# Patient Record
Sex: MALE | Race: WHITE | NOT HISPANIC OR LATINO | URBAN - METROPOLITAN AREA
[De-identification: names, ages, dates, MRNs, and addresses within clinical notes are randomized per-mention and may not be internally consistent; named-entity substitution may affect disease eponyms.]

---

## 2017-01-06 ENCOUNTER — EMERGENCY (EMERGENCY)
Facility: HOSPITAL | Age: 37
LOS: 1 days | Discharge: HOME | End: 2017-01-06
Attending: EMERGENCY MEDICINE
Payer: SELF-PAY

## 2017-01-06 VITALS
RESPIRATION RATE: 20 BRPM | TEMPERATURE: 99 F | DIASTOLIC BLOOD PRESSURE: 77 MMHG | SYSTOLIC BLOOD PRESSURE: 108 MMHG | HEART RATE: 85 BPM | WEIGHT: 154.98 LBS | OXYGEN SATURATION: 100 % | HEIGHT: 68 IN

## 2017-01-06 DIAGNOSIS — M25.561 PAIN IN RIGHT KNEE: ICD-10-CM

## 2017-01-06 DIAGNOSIS — M54.5 LOW BACK PAIN: ICD-10-CM

## 2017-01-06 DIAGNOSIS — G89.29 OTHER CHRONIC PAIN: ICD-10-CM

## 2017-01-06 DIAGNOSIS — Z98.890 OTHER SPECIFIED POSTPROCEDURAL STATES: ICD-10-CM

## 2017-01-06 DIAGNOSIS — M54.9 DORSALGIA, UNSPECIFIED: ICD-10-CM

## 2017-01-06 DIAGNOSIS — Z98.890 OTHER SPECIFIED POSTPROCEDURAL STATES: Chronic | ICD-10-CM

## 2017-01-06 DIAGNOSIS — Z79.01 LONG TERM (CURRENT) USE OF ANTICOAGULANTS: ICD-10-CM

## 2017-01-06 PROBLEM — Z00.00 ENCOUNTER FOR PREVENTIVE HEALTH EXAMINATION: Status: ACTIVE | Noted: 2017-01-06

## 2017-01-06 PROCEDURE — 99282 EMERGENCY DEPT VISIT SF MDM: CPT

## 2017-01-06 PROCEDURE — 99284 EMERGENCY DEPT VISIT MOD MDM: CPT | Mod: 25

## 2017-01-06 RX ORDER — ACETAMINOPHEN 500 MG
650 TABLET ORAL ONCE
Qty: 0 | Refills: 0 | Status: COMPLETED | OUTPATIENT
Start: 2017-01-06 | End: 2017-01-06

## 2017-01-06 RX ADMIN — Medication 650 MILLIGRAM(S): at 12:43

## 2017-01-06 RX ADMIN — Medication 650 MILLIGRAM(S): at 11:51

## 2017-01-06 NOTE — ED STATDOCS - ATTENDING CONTRIBUTION TO CARE
I, Masoud Sotelo, performed the initial face to face bedside interview with this patient regarding history of present illness, review of symptoms and relevant past medical, social and family history.  I completed an independent physical examination.  I was the provider who initially evaluated this patient.  The history, relevant review of systems, past medical and surgical history, medical decision making, and physical examination was documented by the scribe in my presence and I attest to the accuracy of the documentation. Follow-up on ordered tests (ie labs, radiologic studies) and re-evaluation of the patient's status has been communicated to the ACP.  Disposition of the patient will be based on test outcome and response to ED interventions.
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
## 2017-01-06 NOTE — ED STATDOCS - CARE PLAN
Principal Discharge DX:	Chronic pain of right knee  Secondary Diagnosis:	Chronic midline low back pain without sciatica
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
## 2017-01-06 NOTE — ED STATDOCS - MEDICAL DECISION MAKING DETAILS
35 y/o M pt transferred from [facility] for social work intervention and emergency housing placement. Aram rosado pt.

## 2017-01-06 NOTE — ED STATDOCS - NS ED MD SCRIBE ATTENDING SCRIBE SECTIONS
DISPOSITION/PROGRESS NOTE REVIEW OF SYSTEMS/PHYSICAL EXAM/PAST MEDICAL/SURGICAL/SOCIAL HISTORY/DISPOSITION/HISTORY OF PRESENT ILLNESS/VITAL SIGNS( Pullset)/HIV

## 2017-01-07 VITALS
RESPIRATION RATE: 17 BRPM | HEART RATE: 67 BPM | SYSTOLIC BLOOD PRESSURE: 106 MMHG | TEMPERATURE: 98 F | DIASTOLIC BLOOD PRESSURE: 71 MMHG | OXYGEN SATURATION: 100 %

## 2017-02-16 ENCOUNTER — APPOINTMENT (OUTPATIENT)
Dept: ORTHOPEDIC SURGERY | Facility: CLINIC | Age: 37
End: 2017-02-16

## 2017-02-16 VITALS
TEMPERATURE: 99.7 F | WEIGHT: 150 LBS | BODY MASS INDEX: 22.22 KG/M2 | HEART RATE: 91 BPM | HEIGHT: 69 IN | SYSTOLIC BLOOD PRESSURE: 119 MMHG | DIASTOLIC BLOOD PRESSURE: 84 MMHG

## 2017-02-16 DIAGNOSIS — S72.391D OTHER FRACTURE OF SHAFT OF RIGHT FEMUR, SUBSEQUENT ENCOUNTER FOR CLOSED FRACTURE WITH ROUTINE HEALING: ICD-10-CM

## 2017-02-16 DIAGNOSIS — S62.615D DISPLACED FRACTURE OF PROXIMAL PHALANX OF LEFT RING FINGER, SUBSEQUENT ENCOUNTER FOR FRACTURE WITH ROUTINE HEALING: ICD-10-CM

## 2017-02-16 DIAGNOSIS — F17.200 NICOTINE DEPENDENCE, UNSPECIFIED, UNCOMPLICATED: ICD-10-CM

## 2017-02-16 DIAGNOSIS — Z78.9 OTHER SPECIFIED HEALTH STATUS: ICD-10-CM

## 2017-02-16 DIAGNOSIS — M79.642 PAIN IN RIGHT HAND: ICD-10-CM

## 2017-02-16 DIAGNOSIS — M79.641 PAIN IN RIGHT HAND: ICD-10-CM

## 2017-02-16 DIAGNOSIS — S62.325D DISPLACED FRACTURE OF SHAFT OF FOURTH METACARPAL BONE, LEFT HAND, SUBSEQUENT ENCOUNTER FOR FRACTURE WITH ROUTINE HEALING: ICD-10-CM

## 2017-02-16 DIAGNOSIS — S62.356D NONDISPLACED FRACTURE OF SHAFT OF FIFTH METACARPAL BONE, RIGHT HAND, SUBSEQUENT ENCOUNTER FOR FRACTURE WITH ROUTINE HEALING: ICD-10-CM

## 2017-02-16 PROBLEM — S06.9X9A UNSPECIFIED INTRACRANIAL INJURY WITH LOSS OF CONSCIOUSNESS OF UNSPECIFIED DURATION, INITIAL ENCOUNTER: Chronic | Status: ACTIVE | Noted: 2017-01-06

## 2017-02-16 PROBLEM — F19.90 OTHER PSYCHOACTIVE SUBSTANCE USE, UNSPECIFIED, UNCOMPLICATED: Chronic | Status: ACTIVE | Noted: 2017-01-06

## 2017-06-20 ENCOUNTER — APPOINTMENT (OUTPATIENT)
Dept: ORTHOPEDIC SURGERY | Facility: CLINIC | Age: 37
End: 2017-06-20

## 2018-06-18 ENCOUNTER — EMERGENCY (EMERGENCY)
Facility: HOSPITAL | Age: 38
LOS: 1 days | Discharge: HOME | End: 2018-06-18
Attending: EMERGENCY MEDICINE
Payer: MEDICAID

## 2018-06-18 VITALS
HEART RATE: 95 BPM | OXYGEN SATURATION: 89 % | WEIGHT: 169.98 LBS | HEIGHT: 67 IN | SYSTOLIC BLOOD PRESSURE: 92 MMHG | DIASTOLIC BLOOD PRESSURE: 59 MMHG | TEMPERATURE: 98 F | RESPIRATION RATE: 12 BRPM

## 2018-06-18 DIAGNOSIS — Z98.890 OTHER SPECIFIED POSTPROCEDURAL STATES: Chronic | ICD-10-CM

## 2018-06-18 LAB
ALBUMIN SERPL ELPH-MCNC: 4.1 G/DL — SIGNIFICANT CHANGE UP (ref 3.3–5.2)
ALP SERPL-CCNC: 77 U/L — SIGNIFICANT CHANGE UP (ref 40–120)
ALT FLD-CCNC: 14 U/L — SIGNIFICANT CHANGE UP
ANION GAP SERPL CALC-SCNC: 16 MMOL/L — SIGNIFICANT CHANGE UP (ref 5–17)
APAP SERPL-MCNC: <7.5 UG/ML — LOW (ref 10–26)
AST SERPL-CCNC: 18 U/L — SIGNIFICANT CHANGE UP
BASOPHILS NFR BLD AUTO: 2 % — SIGNIFICANT CHANGE UP (ref 0–2)
BILIRUB SERPL-MCNC: 0.6 MG/DL — SIGNIFICANT CHANGE UP (ref 0.4–2)
BUN SERPL-MCNC: 10 MG/DL — SIGNIFICANT CHANGE UP (ref 8–20)
CALCIUM SERPL-MCNC: 9.2 MG/DL — SIGNIFICANT CHANGE UP (ref 8.6–10.2)
CHLORIDE SERPL-SCNC: 102 MMOL/L — SIGNIFICANT CHANGE UP (ref 98–107)
CO2 SERPL-SCNC: 24 MMOL/L — SIGNIFICANT CHANGE UP (ref 22–29)
CREAT SERPL-MCNC: 0.8 MG/DL — SIGNIFICANT CHANGE UP (ref 0.5–1.3)
EOSINOPHIL NFR BLD AUTO: 1 % — SIGNIFICANT CHANGE UP (ref 0–6)
ETHANOL SERPL-MCNC: 46 MG/DL — SIGNIFICANT CHANGE UP
GLUCOSE SERPL-MCNC: 89 MG/DL — SIGNIFICANT CHANGE UP (ref 70–115)
HCT VFR BLD CALC: 42.4 % — SIGNIFICANT CHANGE UP (ref 42–52)
HGB BLD-MCNC: 14.5 G/DL — SIGNIFICANT CHANGE UP (ref 14–18)
LYMPHOCYTES # BLD AUTO: 32 % — SIGNIFICANT CHANGE UP (ref 20–55)
MCHC RBC-ENTMCNC: 33.3 PG — HIGH (ref 27–31)
MCHC RBC-ENTMCNC: 34.2 G/DL — SIGNIFICANT CHANGE UP (ref 32–36)
MCV RBC AUTO: 97.2 FL — HIGH (ref 80–94)
MONOCYTES NFR BLD AUTO: 3 % — SIGNIFICANT CHANGE UP (ref 3–10)
NEUTROPHILS NFR BLD AUTO: 62 % — SIGNIFICANT CHANGE UP (ref 37–73)
PCP SPEC-MCNC: SIGNIFICANT CHANGE UP
PLATELET # BLD AUTO: 352 K/UL — SIGNIFICANT CHANGE UP (ref 150–400)
POTASSIUM SERPL-MCNC: 3.4 MMOL/L — LOW (ref 3.5–5.3)
POTASSIUM SERPL-SCNC: 3.4 MMOL/L — LOW (ref 3.5–5.3)
PROT SERPL-MCNC: 6.8 G/DL — SIGNIFICANT CHANGE UP (ref 6.6–8.7)
RBC # BLD: 4.36 M/UL — LOW (ref 4.6–6.2)
RBC # FLD: 13 % — SIGNIFICANT CHANGE UP (ref 11–15.6)
SALICYLATES SERPL-MCNC: <0.6 MG/DL — LOW (ref 10–20)
SODIUM SERPL-SCNC: 142 MMOL/L — SIGNIFICANT CHANGE UP (ref 135–145)
WBC # BLD: 8.2 K/UL — SIGNIFICANT CHANGE UP (ref 4.8–10.8)
WBC # FLD AUTO: 8.2 K/UL — SIGNIFICANT CHANGE UP (ref 4.8–10.8)

## 2018-06-18 PROCEDURE — 36415 COLL VENOUS BLD VENIPUNCTURE: CPT

## 2018-06-18 PROCEDURE — 80053 COMPREHEN METABOLIC PANEL: CPT

## 2018-06-18 PROCEDURE — 93005 ELECTROCARDIOGRAM TRACING: CPT

## 2018-06-18 PROCEDURE — 99285 EMERGENCY DEPT VISIT HI MDM: CPT

## 2018-06-18 PROCEDURE — 99284 EMERGENCY DEPT VISIT MOD MDM: CPT

## 2018-06-18 PROCEDURE — 93010 ELECTROCARDIOGRAM REPORT: CPT

## 2018-06-18 PROCEDURE — 85027 COMPLETE CBC AUTOMATED: CPT

## 2018-06-18 PROCEDURE — 80307 DRUG TEST PRSMV CHEM ANLYZR: CPT

## 2018-06-18 RX ORDER — SODIUM CHLORIDE 9 MG/ML
1000 INJECTION INTRAMUSCULAR; INTRAVENOUS; SUBCUTANEOUS ONCE
Qty: 0 | Refills: 0 | Status: COMPLETED | OUTPATIENT
Start: 2018-06-18 | End: 2018-06-18

## 2018-06-18 RX ADMIN — SODIUM CHLORIDE 1000 MILLILITER(S): 9 INJECTION INTRAMUSCULAR; INTRAVENOUS; SUBCUTANEOUS at 21:10

## 2018-06-18 NOTE — ED PROVIDER NOTE - PROGRESS NOTE DETAILS
clincially sober mabuilating in nad nml vitals no si no hi return to ed for intractable HA, persistent vomiting, or new onset motor/sensory deficits

## 2018-06-18 NOTE — ED PROVIDER NOTE - CONSTITUTIONAL, MLM
normal... Well appearing, well nourished, awake, alert, oriented to person, place and in no apparent distress, lethargic, awakes to verbal stimuli

## 2018-06-18 NOTE — ED PROVIDER NOTE - PHYSICAL EXAMINATION
perrl  eomi  cranial nerves  axox2  extremitoes 2+ rad pulses  no evidence of injuries  wel healed mdline scar  no dti

## 2018-06-18 NOTE — ED PROVIDER NOTE - OBJECTIVE STATEMENT
36 y/o M pt presents to the ED BIBA with c/o intoxication. Pt was found AMS at [facility] after taking approximately 22 xanax. Pt denies Etoh or any other drugs. A&Ox2. Pt responds to painful stimuli. Denies abd pain, NVD, fevers, chills, fall, dizziness.  No further complaints at this time. 38 y/o M pt presents to the ED BIBA with c/o intoxication. Pt was found AMS at [facility] after taking approximately 22tabs of 0.5mg xanax (confirmed by pill count at bedside - prescription filled today). Pt denies Etoh or any other drugs. A&Ox2. Pt responds to painful stimuli. Denies abd pain, NVD, fevers, chills, fall, dizziness.  No further complaints at this time.

## 2018-06-18 NOTE — ED ADULT NURSE NOTE - OBJECTIVE STATEMENT
Pt received in [id number] on cm with , in yellow gown with no belongings @ bedside per policy, #20G IV noted to right ac, site asymp IVF infusing, pt BIBA c/o overdose on xanax, pt reports he took xanax and drank 2x beers and was trying to order pizza when he fell asleep. Pt airway patent, RR even and unlabored, NSR on cm, Ranjana 2 100% on 2l/min via NC and End tidal co2 in place. MAEx4, denies trauma denies daily ETOH use denies falls denies LOC denies pain, safety maintained, HOB elevated, in no apparent distress @ this time

## 2018-06-18 NOTE — ED ADULT TRIAGE NOTE - CHIEF COMPLAINT QUOTE
Pt biba s/p being found in a store, pt was taking multiple Xanax pills while he was ordering pizza at the register.  Pt is responsive to painful stimuli.  Pt has alcohol on breath.  Pt denies any thought hurting himself.  Pt placed on yellow gown.

## 2018-06-18 NOTE — ED PROVIDER NOTE - ATTENDING CONTRIBUTION TO CARE
36 y/o M pt presents to the ED BIBA with c/o intoxication. Pt was found AMS at [facility] after taking approximately 22tabs of 0.5mg xanax (confirmed by pill count at bedside - prescription filled today). Pt denies Etoh or any other drugs. A&Ox2. Pt responds to painful stimuli. Alert, lucid, and in no apparent distress. Pt is normocephalic, atraumatic.  Pupils are equal, round,External ear without bleeding or mass, no nasal discharge or malodor, lips pink, moist mucous membranes, tongue midline. Neck supple.   Lungs clear to ausultation. Heart regular rate and rhythm, normal S1, S2, no murmurs, gallops, rubs.  Abdomen is soft, nontender, no pulsatile mass, no masses, no distension, no rebound. No CVA Tenderness, no suprapubic tenderness.   Non-focal sensory, 5 out of 5 motor strength,   bzd overdose await sobriety

## 2018-06-18 NOTE — ED ADULT NURSE NOTE - CHPI ED SYMPTOMS NEG
no abdominal distension/no abdominal pain/no confusion/no vomiting/no chills/no pain/no nausea/no fever

## 2018-06-18 NOTE — ED PROVIDER NOTE - MEDICAL DECISION MAKING DETAILS
38 y/o M no sig pmhx presents with xanax OD. Will check labs, monitor rate, no need for flumazenil due to nml respiratory status, consult social work, DC when stable.

## 2018-06-19 VITALS
OXYGEN SATURATION: 100 % | DIASTOLIC BLOOD PRESSURE: 78 MMHG | SYSTOLIC BLOOD PRESSURE: 112 MMHG | RESPIRATION RATE: 18 BRPM | HEART RATE: 81 BPM

## 2018-06-19 NOTE — ED ADULT NURSE REASSESSMENT NOTE - NS ED NURSE REASSESS COMMENT FT1
MD made aware pt requesting to take home xanax prescription, MD Green bedside to discuss with pt, Charge RN made aware, Rx xanax given to charge rn for lock up with security, pt to get approval from MD who Rx'd xanax to take home, pt pending p/u from cab
xanax placed with security, Pt made aware # [id number]
Pt awake and alert, ambulating in ED with steady gait noted, appears clinically sober and in no apparent distress, pt states "im sober when can I leave here?", MD Green made aware, pending d/c from MD Green, pt back on stretcher in [id number], calm and cooperative resting @ this time, safety maintained

## 2018-08-20 ENCOUNTER — EMERGENCY (EMERGENCY)
Facility: HOSPITAL | Age: 38
LOS: 1 days | Discharge: HOME | End: 2018-08-20
Attending: EMERGENCY MEDICINE
Payer: MEDICAID

## 2018-08-20 VITALS
SYSTOLIC BLOOD PRESSURE: 116 MMHG | OXYGEN SATURATION: 94 % | TEMPERATURE: 98 F | HEIGHT: 67 IN | HEART RATE: 94 BPM | WEIGHT: 160.06 LBS | DIASTOLIC BLOOD PRESSURE: 72 MMHG | RESPIRATION RATE: 18 BRPM

## 2018-08-20 DIAGNOSIS — Z79.899 OTHER LONG TERM (CURRENT) DRUG THERAPY: ICD-10-CM

## 2018-08-20 DIAGNOSIS — Z98.890 OTHER SPECIFIED POSTPROCEDURAL STATES: Chronic | ICD-10-CM

## 2018-08-20 DIAGNOSIS — Z98.890 OTHER SPECIFIED POSTPROCEDURAL STATES: ICD-10-CM

## 2018-08-20 DIAGNOSIS — F10.129 ALCOHOL ABUSE WITH INTOXICATION, UNSPECIFIED: ICD-10-CM

## 2018-08-20 DIAGNOSIS — F17.200 NICOTINE DEPENDENCE, UNSPECIFIED, UNCOMPLICATED: ICD-10-CM

## 2018-08-20 DIAGNOSIS — Z79.01 LONG TERM (CURRENT) USE OF ANTICOAGULANTS: ICD-10-CM

## 2018-08-20 DIAGNOSIS — Z79.891 LONG TERM (CURRENT) USE OF OPIATE ANALGESIC: ICD-10-CM

## 2018-08-20 PROCEDURE — 82962 GLUCOSE BLOOD TEST: CPT

## 2018-08-20 PROCEDURE — 99284 EMERGENCY DEPT VISIT MOD MDM: CPT

## 2018-08-20 PROCEDURE — 99285 EMERGENCY DEPT VISIT HI MDM: CPT

## 2018-08-20 NOTE — ED PROVIDER NOTE - PHYSICAL EXAMINATION
VITAL SIGNS: I have reviewed nursing notes and confirm.  CONSTITUTIONAL: Well-developed; well-nourished; in no acute distress.  SKIN: Skin exam is warm and dry, no acute rash.  HEAD: Normocephalic; atraumatic.   EYES: PERRL, EOM intact; (+) bilatera eye injection.   ENT: No nasal discharge; airway clear. Throat clear.  NECK: Supple; non tender.  No lymphadenopathy.  CARD: S1, S2 normal; no murmurs, gallops, or rubs. Regular rate and rhythm.  RESP: No wheezes, rales or rhonchi.  ABD: Normal bowel sounds; soft; non-distended; non-tender; no hepatosplenomegaly.  EXT: Normal ROM. No clubbing, cyanosis or edema.  NEURO: slurred speech, AO x 3, moves all extremities, Grossly unremarkable. No focal deficits.  PSYCH: Cooperative, appropriate.
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
## 2018-08-20 NOTE — ED ADULT NURSE NOTE - NSIMPLEMENTINTERV_GEN_ALL_ED
Implemented All Fall Risk Interventions:  [place] to call system. Call bell, personal items and telephone within reach. Instruct patient to call for assistance. Room bathroom lighting operational. Non-slip footwear when patient is off stretcher. Physically safe environment: no spills, clutter or unnecessary equipment. Stretcher in lowest position, wheels locked, appropriate side rails in place. Provide visual cue, wrist band, yellow gown, etc. Monitor gait and stability. Monitor for mental status changes and reorient to person, place, and time. Review medications for side effects contributing to fall risk. Reinforce activity limits and safety measures with patient and family.

## 2018-08-20 NOTE — ED PROVIDER NOTE - NS ED ROS FT
Review of Systems:  	•	CONSTITUTIONAL - no fever, no diaphoresis, no weight change  	•	SKIN - no rash  	•	HEMATOLOGIC - no bleeding, no bruising  	•	EYES - no eye pain, no blurred vision  	•	ENT - no change in hearing, no pain  	•	RESPIRATORY - no shortness of breath, no cough  	•	CARDIAC - no chest pain, no palpitations  	•	GI - no abd pain, no nausea, no vomiting, no diarrhea, no constipation, no bleeding  	•	GENITO-URINARY - no discharge, no dysuria; no hematuria,   	•	ENDO - no polydypsia, no polyurea, no heat/no cold intolerance  	•	MUSCULOSKELETAL - no joint paint, no swelling, no redness  	•	NEUROLOGIC - no weakness, no headache, no anesthesia, no paresthesias  	•	PSYCH - no anxiety, non suicidal, non homicidal, no hallucination, no depression

## 2018-10-16 ENCOUNTER — EMERGENCY (EMERGENCY)
Facility: HOSPITAL | Age: 38
LOS: 1 days | Discharge: HOME | End: 2018-10-16
Attending: STUDENT IN AN ORGANIZED HEALTH CARE EDUCATION/TRAINING PROGRAM
Payer: MEDICAID

## 2018-10-16 VITALS
WEIGHT: 156.97 LBS | RESPIRATION RATE: 18 BRPM | HEIGHT: 68 IN | OXYGEN SATURATION: 99 % | HEART RATE: 61 BPM | TEMPERATURE: 98 F | DIASTOLIC BLOOD PRESSURE: 92 MMHG | SYSTOLIC BLOOD PRESSURE: 146 MMHG

## 2018-10-16 DIAGNOSIS — Z98.890 OTHER SPECIFIED POSTPROCEDURAL STATES: Chronic | ICD-10-CM

## 2018-10-16 PROCEDURE — 99284 EMERGENCY DEPT VISIT MOD MDM: CPT | Mod: 25

## 2018-10-16 NOTE — ED ADULT TRIAGE NOTE - CHIEF COMPLAINT QUOTE
pt biba c/o increased right-sided weakness.  pt states he usually has weakness before he has seizures, but has not had a seizure in two years.  no witnessed seizure activity.  pt's speech is slow.  as per ems, staff at group home stated speech is baseline due to hx TBI.  no facial droop noted.  pt CORBIN.  hand grasp and foot press equal and strong b/l.

## 2018-10-17 VITALS
RESPIRATION RATE: 18 BRPM | OXYGEN SATURATION: 99 % | HEART RATE: 68 BPM | TEMPERATURE: 98 F | DIASTOLIC BLOOD PRESSURE: 76 MMHG | SYSTOLIC BLOOD PRESSURE: 129 MMHG

## 2018-10-17 LAB
ALBUMIN SERPL ELPH-MCNC: 4.1 G/DL — SIGNIFICANT CHANGE UP (ref 3.3–5.2)
ALP SERPL-CCNC: 74 U/L — SIGNIFICANT CHANGE UP (ref 40–120)
ALT FLD-CCNC: 10 U/L — SIGNIFICANT CHANGE UP
ANION GAP SERPL CALC-SCNC: 9 MMOL/L — SIGNIFICANT CHANGE UP (ref 5–17)
ANISOCYTOSIS BLD QL: SLIGHT — SIGNIFICANT CHANGE UP
AST SERPL-CCNC: 17 U/L — SIGNIFICANT CHANGE UP
BASOPHILS NFR BLD AUTO: 2 % — SIGNIFICANT CHANGE UP (ref 0–2)
BILIRUB SERPL-MCNC: 0.4 MG/DL — SIGNIFICANT CHANGE UP (ref 0.4–2)
BUN SERPL-MCNC: 21 MG/DL — HIGH (ref 8–20)
CALCIUM SERPL-MCNC: 9.1 MG/DL — SIGNIFICANT CHANGE UP (ref 8.6–10.2)
CHLORIDE SERPL-SCNC: 108 MMOL/L — HIGH (ref 98–107)
CO2 SERPL-SCNC: 27 MMOL/L — SIGNIFICANT CHANGE UP (ref 22–29)
CREAT SERPL-MCNC: 0.72 MG/DL — SIGNIFICANT CHANGE UP (ref 0.5–1.3)
EOSINOPHIL NFR BLD AUTO: 4 % — SIGNIFICANT CHANGE UP (ref 0–6)
GLUCOSE SERPL-MCNC: 89 MG/DL — SIGNIFICANT CHANGE UP (ref 70–115)
HCT VFR BLD CALC: 43.2 % — SIGNIFICANT CHANGE UP (ref 42–52)
HGB BLD-MCNC: 14.4 G/DL — SIGNIFICANT CHANGE UP (ref 14–18)
LYMPHOCYTES # BLD AUTO: 14 % — LOW (ref 20–55)
MACROCYTES BLD QL: SLIGHT — SIGNIFICANT CHANGE UP
MCHC RBC-ENTMCNC: 33.3 G/DL — SIGNIFICANT CHANGE UP (ref 32–36)
MCHC RBC-ENTMCNC: 34.1 PG — HIGH (ref 27–31)
MCV RBC AUTO: 102.4 FL — HIGH (ref 80–94)
MICROCYTES BLD QL: SLIGHT — SIGNIFICANT CHANGE UP
MONOCYTES NFR BLD AUTO: 5 % — SIGNIFICANT CHANGE UP (ref 3–10)
NEUTROPHILS NFR BLD AUTO: 75 % — HIGH (ref 37–73)
PLAT MORPH BLD: NORMAL — SIGNIFICANT CHANGE UP
PLATELET # BLD AUTO: 386 K/UL — SIGNIFICANT CHANGE UP (ref 150–400)
POTASSIUM SERPL-MCNC: 3.8 MMOL/L — SIGNIFICANT CHANGE UP (ref 3.5–5.3)
POTASSIUM SERPL-SCNC: 3.8 MMOL/L — SIGNIFICANT CHANGE UP (ref 3.5–5.3)
PROT SERPL-MCNC: 6.6 G/DL — SIGNIFICANT CHANGE UP (ref 6.6–8.7)
RBC # BLD: 4.22 M/UL — LOW (ref 4.6–6.2)
RBC # FLD: 13.8 % — SIGNIFICANT CHANGE UP (ref 11–15.6)
RBC BLD AUTO: ABNORMAL
SODIUM SERPL-SCNC: 144 MMOL/L — SIGNIFICANT CHANGE UP (ref 135–145)
TSH SERPL-MCNC: 3.57 UIU/ML — SIGNIFICANT CHANGE UP (ref 0.27–4.2)
WBC # BLD: 12.1 K/UL — HIGH (ref 4.8–10.8)
WBC # FLD AUTO: 12.1 K/UL — HIGH (ref 4.8–10.8)

## 2018-10-17 PROCEDURE — 80053 COMPREHEN METABOLIC PANEL: CPT

## 2018-10-17 PROCEDURE — 99283 EMERGENCY DEPT VISIT LOW MDM: CPT

## 2018-10-17 PROCEDURE — 85027 COMPLETE CBC AUTOMATED: CPT

## 2018-10-17 PROCEDURE — 84443 ASSAY THYROID STIM HORMONE: CPT

## 2018-10-17 PROCEDURE — 36415 COLL VENOUS BLD VENIPUNCTURE: CPT

## 2018-10-17 NOTE — ED ADULT NURSE NOTE - NSIMPLEMENTINTERV_GEN_ALL_ED
Implemented All Universal Safety Interventions:  [place] to call system. Call bell, personal items and telephone within reach. Instruct patient to call for assistance. Room bathroom lighting operational. Non-slip footwear when patient is off stretcher. Physically safe environment: no spills, clutter or unnecessary equipment. Stretcher in lowest position, wheels locked, appropriate side rails in place.

## 2018-10-17 NOTE — ED ADULT NURSE NOTE - OBJECTIVE STATEMENT
Pt. presents to ED with c/o weakness. Slurred speech noted, states Hx of TBI. able to ambulate independently

## 2018-10-17 NOTE — ED PROVIDER NOTE - MEDICAL DECISION MAKING DETAILS
obtain labs, evaluate for acute reason of weakness, likely related to pt's fatigue from poor sleeping and use of xanax.

## 2018-10-17 NOTE — ED PROVIDER NOTE - OBJECTIVE STATEMENT
39 y/o M pt with PMHx TBI, spleen surgery, anxiety presents to the ED c/o persistent dizziness and weakness for 3 weeks.  Pt states he doesn't sleep well.  He takes xanax.  Denies fever, chills, N/V, back pain, urinary symptoms, LOC.  No further acute complaints at this time.

## 2018-11-01 ENCOUNTER — OUTPATIENT (OUTPATIENT)
Dept: OUTPATIENT SERVICES | Facility: HOSPITAL | Age: 38
LOS: 1 days | End: 2018-11-01
Payer: MEDICAID

## 2018-11-01 DIAGNOSIS — Z98.890 OTHER SPECIFIED POSTPROCEDURAL STATES: Chronic | ICD-10-CM

## 2018-11-16 ENCOUNTER — EMERGENCY (EMERGENCY)
Facility: HOSPITAL | Age: 38
LOS: 1 days | Discharge: HOME | End: 2018-11-16
Attending: EMERGENCY MEDICINE
Payer: MEDICAID

## 2018-11-16 VITALS
WEIGHT: 160.06 LBS | RESPIRATION RATE: 20 BRPM | DIASTOLIC BLOOD PRESSURE: 80 MMHG | HEIGHT: 68 IN | OXYGEN SATURATION: 98 % | SYSTOLIC BLOOD PRESSURE: 118 MMHG | HEART RATE: 85 BPM | TEMPERATURE: 98 F

## 2018-11-16 VITALS
RESPIRATION RATE: 17 BRPM | TEMPERATURE: 97 F | OXYGEN SATURATION: 98 % | SYSTOLIC BLOOD PRESSURE: 112 MMHG | HEART RATE: 92 BPM | DIASTOLIC BLOOD PRESSURE: 72 MMHG

## 2018-11-16 DIAGNOSIS — Z98.890 OTHER SPECIFIED POSTPROCEDURAL STATES: Chronic | ICD-10-CM

## 2018-11-16 PROBLEM — S06.9X9A UNSPECIFIED INTRACRANIAL INJURY WITH LOSS OF CONSCIOUSNESS OF UNSPECIFIED DURATION, INITIAL ENCOUNTER: Chronic | Status: ACTIVE | Noted: 2018-10-17

## 2018-11-16 LAB
ALBUMIN SERPL ELPH-MCNC: 4.6 G/DL — SIGNIFICANT CHANGE UP (ref 3.3–5.2)
ALP SERPL-CCNC: 101 U/L — SIGNIFICANT CHANGE UP (ref 40–120)
ALT FLD-CCNC: 18 U/L — SIGNIFICANT CHANGE UP
AMPHET UR-MCNC: NEGATIVE — SIGNIFICANT CHANGE UP
ANION GAP SERPL CALC-SCNC: 13 MMOL/L — SIGNIFICANT CHANGE UP (ref 5–17)
APAP SERPL-MCNC: <7.5 UG/ML — LOW (ref 10–26)
APTT BLD: 30.7 SEC — SIGNIFICANT CHANGE UP (ref 27.5–36.3)
AST SERPL-CCNC: 24 U/L — SIGNIFICANT CHANGE UP
BARBITURATES UR SCN-MCNC: NEGATIVE — SIGNIFICANT CHANGE UP
BENZODIAZ UR-MCNC: POSITIVE
BILIRUB SERPL-MCNC: 0.5 MG/DL — SIGNIFICANT CHANGE UP (ref 0.4–2)
BUN SERPL-MCNC: 11 MG/DL — SIGNIFICANT CHANGE UP (ref 8–20)
CALCIUM SERPL-MCNC: 9.4 MG/DL — SIGNIFICANT CHANGE UP (ref 8.6–10.2)
CHLORIDE SERPL-SCNC: 100 MMOL/L — SIGNIFICANT CHANGE UP (ref 98–107)
CO2 SERPL-SCNC: 27 MMOL/L — SIGNIFICANT CHANGE UP (ref 22–29)
COCAINE METAB.OTHER UR-MCNC: NEGATIVE — SIGNIFICANT CHANGE UP
CREAT SERPL-MCNC: 0.63 MG/DL — SIGNIFICANT CHANGE UP (ref 0.5–1.3)
ETHANOL SERPL-MCNC: 19 MG/DL — SIGNIFICANT CHANGE UP
GLUCOSE SERPL-MCNC: 124 MG/DL — HIGH (ref 70–115)
HCT VFR BLD CALC: 45.8 % — SIGNIFICANT CHANGE UP (ref 42–52)
HGB BLD-MCNC: 15.4 G/DL — SIGNIFICANT CHANGE UP (ref 14–18)
INR BLD: 0.96 RATIO — SIGNIFICANT CHANGE UP (ref 0.88–1.16)
MCHC RBC-ENTMCNC: 33.6 G/DL — SIGNIFICANT CHANGE UP (ref 32–36)
MCHC RBC-ENTMCNC: 34.1 PG — HIGH (ref 27–31)
MCV RBC AUTO: 101.6 FL — HIGH (ref 80–94)
METHADONE UR-MCNC: NEGATIVE — SIGNIFICANT CHANGE UP
OPIATES UR-MCNC: NEGATIVE — SIGNIFICANT CHANGE UP
PCP SPEC-MCNC: SIGNIFICANT CHANGE UP
PCP UR-MCNC: NEGATIVE — SIGNIFICANT CHANGE UP
PLATELET # BLD AUTO: 399 K/UL — SIGNIFICANT CHANGE UP (ref 150–400)
POTASSIUM SERPL-MCNC: 4.1 MMOL/L — SIGNIFICANT CHANGE UP (ref 3.5–5.3)
POTASSIUM SERPL-SCNC: 4.1 MMOL/L — SIGNIFICANT CHANGE UP (ref 3.5–5.3)
PROT SERPL-MCNC: 7.4 G/DL — SIGNIFICANT CHANGE UP (ref 6.6–8.7)
PROTHROM AB SERPL-ACNC: 11 SEC — SIGNIFICANT CHANGE UP (ref 10–12.9)
RBC # BLD: 4.51 M/UL — LOW (ref 4.6–6.2)
RBC # FLD: 13.2 % — SIGNIFICANT CHANGE UP (ref 11–15.6)
SALICYLATES SERPL-MCNC: <0.6 MG/DL — LOW (ref 10–20)
SODIUM SERPL-SCNC: 140 MMOL/L — SIGNIFICANT CHANGE UP (ref 135–145)
THC UR QL: NEGATIVE — SIGNIFICANT CHANGE UP
WBC # BLD: 8.2 K/UL — SIGNIFICANT CHANGE UP (ref 4.8–10.8)
WBC # FLD AUTO: 8.2 K/UL — SIGNIFICANT CHANGE UP (ref 4.8–10.8)

## 2018-11-16 PROCEDURE — 85730 THROMBOPLASTIN TIME PARTIAL: CPT

## 2018-11-16 PROCEDURE — 99284 EMERGENCY DEPT VISIT MOD MDM: CPT | Mod: 25

## 2018-11-16 PROCEDURE — 70450 CT HEAD/BRAIN W/O DYE: CPT | Mod: 26

## 2018-11-16 PROCEDURE — 36415 COLL VENOUS BLD VENIPUNCTURE: CPT

## 2018-11-16 PROCEDURE — 93010 ELECTROCARDIOGRAM REPORT: CPT

## 2018-11-16 PROCEDURE — 80053 COMPREHEN METABOLIC PANEL: CPT

## 2018-11-16 PROCEDURE — 93005 ELECTROCARDIOGRAM TRACING: CPT

## 2018-11-16 PROCEDURE — 99284 EMERGENCY DEPT VISIT MOD MDM: CPT

## 2018-11-16 PROCEDURE — 85027 COMPLETE CBC AUTOMATED: CPT

## 2018-11-16 PROCEDURE — 85610 PROTHROMBIN TIME: CPT

## 2018-11-16 PROCEDURE — 80307 DRUG TEST PRSMV CHEM ANLYZR: CPT

## 2018-11-16 PROCEDURE — 70450 CT HEAD/BRAIN W/O DYE: CPT

## 2018-11-16 RX ORDER — SODIUM CHLORIDE 9 MG/ML
1000 INJECTION INTRAMUSCULAR; INTRAVENOUS; SUBCUTANEOUS
Qty: 0 | Refills: 0 | Status: DISCONTINUED | OUTPATIENT
Start: 2018-11-16 | End: 2018-11-21

## 2018-11-16 RX ORDER — SODIUM CHLORIDE 9 MG/ML
3 INJECTION INTRAMUSCULAR; INTRAVENOUS; SUBCUTANEOUS ONCE
Qty: 0 | Refills: 0 | Status: COMPLETED | OUTPATIENT
Start: 2018-11-16 | End: 2018-11-16

## 2018-11-16 RX ADMIN — SODIUM CHLORIDE 100 MILLILITER(S): 9 INJECTION INTRAMUSCULAR; INTRAVENOUS; SUBCUTANEOUS at 18:23

## 2018-11-16 RX ADMIN — SODIUM CHLORIDE 3 MILLILITER(S): 9 INJECTION INTRAMUSCULAR; INTRAVENOUS; SUBCUTANEOUS at 18:23

## 2018-11-16 NOTE — ED ADULT NURSE NOTE - NSIMPLEMENTINTERV_GEN_ALL_ED
Implemented All Fall Risk Interventions:  [place] to call system. Call bell, personal items and telephone within reach. Instruct patient to call for assistance. Room bathroom lighting operational. Non-slip footwear when patient is off stretcher. Physically safe environment: no spills, clutter or unnecessary equipment. Stretcher in lowest position, wheels locked, appropriate side rails in place. Provide visual cue, wrist band, yellow gown, etc. Monitor gait and stability. Monitor for mental status changes and reorient to person, place, and time. Review medications for side effects contributing to fall risk. Reinforce activity limits and safety measures with patient and family.

## 2018-11-16 NOTE — ED PROVIDER NOTE - SHIFT CHANGE DETAILS
PT SENT FROM [facility] FOR MEDICAL CLEARANCE  PT IS MEDICALLY CLEAR AND CAN BE DISCHARGE BUT I DON'T KNOW WHERE HE CAME FROM  [facility] TO DISPO/PLACE
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
## 2018-11-16 NOTE — ED PROVIDER NOTE - OBJECTIVE STATEMENT
C/C " I FELL"  39 YO MALE WITH ABOVE C/C. SENT HERE FROM [facility] STAFF FELT HE WAS ACTING ABNORMALLY. PT STATES HE IS ON AMITRIIPTYLINE AND ALPRAZOLAM FOR SEIZURES. PT HAS HX TBI. PT STATES HE FEELS WELL AND TAKES HIS MEDICATION. PT SHOWED ME HIS MEDICATION AND I DISCOVERED THAT HE TOOK MORE THAN PRESCRIBED AMITRIPTYLINE AND PRESCRIBED DOSE OF ALPRAZOLAM. HE HAS NO COMPLAINTS OF PAIN.   MED HX IV drug user    TBI (traumatic brain injury)    TBI (traumatic brain injury)
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
## 2018-11-16 NOTE — ED ADULT NURSE NOTE - OBJECTIVE STATEMENT
assumed pt care at 1635. pt sleepy but arousable to name. stated he last drank a beer at 2pm and took xanax earlier today as well. stated he had a seizure and fell but this was not his first seizure. pt denies any chest pain or SOB. No s/s of respiratory distress noted. Pt denies nausea/vomiting/diarrhea. No diaphoresis noted. Pt has alcohol on his breath. Placed in yellow gown for safety. will continue to monitor.

## 2018-11-19 DIAGNOSIS — Z71.89 OTHER SPECIFIED COUNSELING: ICD-10-CM

## 2018-12-03 ENCOUNTER — EMERGENCY (EMERGENCY)
Facility: HOSPITAL | Age: 38
LOS: 1 days | Discharge: HOME | End: 2018-12-03
Attending: EMERGENCY MEDICINE
Payer: MEDICAID

## 2018-12-03 VITALS
OXYGEN SATURATION: 97 % | HEIGHT: 67 IN | TEMPERATURE: 98 F | SYSTOLIC BLOOD PRESSURE: 103 MMHG | RESPIRATION RATE: 18 BRPM | WEIGHT: 164.91 LBS | HEART RATE: 81 BPM | DIASTOLIC BLOOD PRESSURE: 71 MMHG

## 2018-12-03 DIAGNOSIS — Z98.890 OTHER SPECIFIED POSTPROCEDURAL STATES: Chronic | ICD-10-CM

## 2018-12-03 LAB
ALBUMIN SERPL ELPH-MCNC: 4.4 G/DL — SIGNIFICANT CHANGE UP (ref 3.3–5.2)
ALP SERPL-CCNC: 94 U/L — SIGNIFICANT CHANGE UP (ref 40–120)
ALT FLD-CCNC: 11 U/L — SIGNIFICANT CHANGE UP
AMPHET UR-MCNC: NEGATIVE — SIGNIFICANT CHANGE UP
ANION GAP SERPL CALC-SCNC: 14 MMOL/L — SIGNIFICANT CHANGE UP (ref 5–17)
APAP SERPL-MCNC: <7.5 UG/ML — LOW (ref 10–26)
APPEARANCE UR: CLEAR — SIGNIFICANT CHANGE UP
APTT BLD: 31.8 SEC — SIGNIFICANT CHANGE UP (ref 27.5–36.3)
AST SERPL-CCNC: 16 U/L — SIGNIFICANT CHANGE UP
BARBITURATES UR SCN-MCNC: NEGATIVE — SIGNIFICANT CHANGE UP
BASOPHILS NFR BLD AUTO: 2 % — SIGNIFICANT CHANGE UP (ref 0–2)
BENZODIAZ UR-MCNC: NEGATIVE — SIGNIFICANT CHANGE UP
BILIRUB SERPL-MCNC: 0.4 MG/DL — SIGNIFICANT CHANGE UP (ref 0.4–2)
BILIRUB UR-MCNC: NEGATIVE — SIGNIFICANT CHANGE UP
BUN SERPL-MCNC: 14 MG/DL — SIGNIFICANT CHANGE UP (ref 8–20)
BURR CELLS BLD QL SMEAR: PRESENT — SIGNIFICANT CHANGE UP
CALCIUM SERPL-MCNC: 9.1 MG/DL — SIGNIFICANT CHANGE UP (ref 8.6–10.2)
CHLORIDE SERPL-SCNC: 105 MMOL/L — SIGNIFICANT CHANGE UP (ref 98–107)
CO2 SERPL-SCNC: 26 MMOL/L — SIGNIFICANT CHANGE UP (ref 22–29)
COCAINE METAB.OTHER UR-MCNC: NEGATIVE — SIGNIFICANT CHANGE UP
COLOR SPEC: YELLOW — SIGNIFICANT CHANGE UP
CREAT SERPL-MCNC: 0.73 MG/DL — SIGNIFICANT CHANGE UP (ref 0.5–1.3)
DIFF PNL FLD: NEGATIVE — SIGNIFICANT CHANGE UP
ELLIPTOCYTES BLD QL SMEAR: SLIGHT — SIGNIFICANT CHANGE UP
EOSINOPHIL NFR BLD AUTO: 1 % — SIGNIFICANT CHANGE UP (ref 0–6)
ETHANOL SERPL-MCNC: 176 MG/DL — SIGNIFICANT CHANGE UP
GIANT PLATELETS BLD QL SMEAR: PRESENT — SIGNIFICANT CHANGE UP
GLUCOSE SERPL-MCNC: 95 MG/DL — SIGNIFICANT CHANGE UP (ref 70–115)
GLUCOSE UR QL: NEGATIVE MG/DL — SIGNIFICANT CHANGE UP
HCT VFR BLD CALC: 47.3 % — SIGNIFICANT CHANGE UP (ref 42–52)
HGB BLD-MCNC: 16.2 G/DL — SIGNIFICANT CHANGE UP (ref 14–18)
INR BLD: 1.05 RATIO — SIGNIFICANT CHANGE UP (ref 0.88–1.16)
KETONES UR-MCNC: NEGATIVE — SIGNIFICANT CHANGE UP
LEUKOCYTE ESTERASE UR-ACNC: NEGATIVE — SIGNIFICANT CHANGE UP
LYMPHOCYTES # BLD AUTO: 37 % — SIGNIFICANT CHANGE UP (ref 20–55)
MCHC RBC-ENTMCNC: 34.1 PG — HIGH (ref 27–31)
MCHC RBC-ENTMCNC: 34.2 G/DL — SIGNIFICANT CHANGE UP (ref 32–36)
MCV RBC AUTO: 99.6 FL — HIGH (ref 80–94)
METHADONE UR-MCNC: NEGATIVE — SIGNIFICANT CHANGE UP
MONOCYTES NFR BLD AUTO: 6 % — SIGNIFICANT CHANGE UP (ref 3–10)
NEUTROPHILS NFR BLD AUTO: 54 % — SIGNIFICANT CHANGE UP (ref 37–73)
NITRITE UR-MCNC: NEGATIVE — SIGNIFICANT CHANGE UP
OPIATES UR-MCNC: NEGATIVE — SIGNIFICANT CHANGE UP
OSMOLALITY SERPL: 351 MOSM/KG — HIGH (ref 280–300)
PCP SPEC-MCNC: SIGNIFICANT CHANGE UP
PCP UR-MCNC: NEGATIVE — SIGNIFICANT CHANGE UP
PH UR: 6.5 — SIGNIFICANT CHANGE UP (ref 5–8)
PLAT MORPH BLD: NORMAL — SIGNIFICANT CHANGE UP
PLATELET # BLD AUTO: 555 K/UL — HIGH (ref 150–400)
POTASSIUM SERPL-MCNC: 4 MMOL/L — SIGNIFICANT CHANGE UP (ref 3.5–5.3)
POTASSIUM SERPL-SCNC: 4 MMOL/L — SIGNIFICANT CHANGE UP (ref 3.5–5.3)
PROT SERPL-MCNC: 7.4 G/DL — SIGNIFICANT CHANGE UP (ref 6.6–8.7)
PROT UR-MCNC: NEGATIVE MG/DL — SIGNIFICANT CHANGE UP
PROTHROM AB SERPL-ACNC: 12.1 SEC — SIGNIFICANT CHANGE UP (ref 10–12.9)
RBC # BLD: 4.75 M/UL — SIGNIFICANT CHANGE UP (ref 4.6–6.2)
RBC # FLD: 12.8 % — SIGNIFICANT CHANGE UP (ref 11–15.6)
RBC BLD AUTO: SIGNIFICANT CHANGE UP
SALICYLATES SERPL-MCNC: <0.6 MG/DL — LOW (ref 10–20)
SODIUM SERPL-SCNC: 145 MMOL/L — SIGNIFICANT CHANGE UP (ref 135–145)
SP GR SPEC: 1.01 — SIGNIFICANT CHANGE UP (ref 1.01–1.02)
THC UR QL: NEGATIVE — SIGNIFICANT CHANGE UP
UROBILINOGEN FLD QL: NEGATIVE MG/DL — SIGNIFICANT CHANGE UP
WBC # BLD: 4.8 K/UL — SIGNIFICANT CHANGE UP (ref 4.8–10.8)
WBC # FLD AUTO: 4.8 K/UL — SIGNIFICANT CHANGE UP (ref 4.8–10.8)

## 2018-12-03 PROCEDURE — 70450 CT HEAD/BRAIN W/O DYE: CPT | Mod: 26

## 2018-12-03 PROCEDURE — 99284 EMERGENCY DEPT VISIT MOD MDM: CPT

## 2018-12-03 PROCEDURE — 83930 ASSAY OF BLOOD OSMOLALITY: CPT

## 2018-12-03 PROCEDURE — 85027 COMPLETE CBC AUTOMATED: CPT

## 2018-12-03 PROCEDURE — 85730 THROMBOPLASTIN TIME PARTIAL: CPT

## 2018-12-03 PROCEDURE — 80053 COMPREHEN METABOLIC PANEL: CPT

## 2018-12-03 PROCEDURE — 70450 CT HEAD/BRAIN W/O DYE: CPT

## 2018-12-03 PROCEDURE — 85610 PROTHROMBIN TIME: CPT

## 2018-12-03 PROCEDURE — 36415 COLL VENOUS BLD VENIPUNCTURE: CPT

## 2018-12-03 PROCEDURE — 80307 DRUG TEST PRSMV CHEM ANLYZR: CPT

## 2018-12-03 PROCEDURE — 81003 URINALYSIS AUTO W/O SCOPE: CPT

## 2018-12-03 RX ORDER — LEVETIRACETAM 250 MG/1
1 TABLET, FILM COATED ORAL
Qty: 60 | Refills: 0 | OUTPATIENT
Start: 2018-12-03 | End: 2019-01-01

## 2018-12-03 RX ORDER — LEVETIRACETAM 250 MG/1
500 TABLET, FILM COATED ORAL ONCE
Qty: 0 | Refills: 0 | Status: COMPLETED | OUTPATIENT
Start: 2018-12-03 | End: 2018-12-03

## 2018-12-03 RX ORDER — SODIUM CHLORIDE 9 MG/ML
1000 INJECTION INTRAMUSCULAR; INTRAVENOUS; SUBCUTANEOUS ONCE
Qty: 0 | Refills: 0 | Status: COMPLETED | OUTPATIENT
Start: 2018-12-03 | End: 2018-12-03

## 2018-12-03 RX ADMIN — SODIUM CHLORIDE 1000 MILLILITER(S): 9 INJECTION INTRAMUSCULAR; INTRAVENOUS; SUBCUTANEOUS at 15:57

## 2018-12-03 RX ADMIN — LEVETIRACETAM 500 MILLIGRAM(S): 250 TABLET, FILM COATED ORAL at 20:14

## 2018-12-03 NOTE — ED ADULT NURSE NOTE - OBJECTIVE STATEMENT
Pt states that he had a seizure while walking home earlier today. denies hitting his head. No complaints of pain.

## 2018-12-03 NOTE — ED PROVIDER NOTE - MUSCULOSKELETAL, MLM
Spine appears normal, range of motion is not limited, no muscle or joint tenderness. No tremor. Spine appears normal, range of motion is not limited, no muscle or joint tenderness. No tremor. No scalp hematoma.

## 2018-12-03 NOTE — ED ADULT TRIAGE NOTE - CHIEF COMPLAINT QUOTE
pt arrive by EMS from [facility] with reports by EMS of taking Ambien and drinking alcohol. pt denies these claims, states "I had a seizure an hour and half ago"

## 2018-12-03 NOTE — ED PROVIDER NOTE - SKIN, MLM
Skin normal color for race, warm, dry and intact. No evidence of rash. Skin normal color for race, warm, dry and intact. No evidence of rash. No external signs of trauma.

## 2018-12-03 NOTE — ED PROVIDER NOTE - OBJECTIVE STATEMENT
38yoM with h/o seizures ( gets one every 6 months), TBI, not followed by neurology , c/o " possible seizure" today. Pt denies any tongue biting/ urinary incontinece.  Stes he had a seizure in a store and that the store keepers told him he had one.  Pt denies any seizure medication.  PMD: Dr. Gracia.  Pt states he was here a few weeks ago also for seizure.  Pt states he has not followed up with neurology because he does not know of a seizure near where he lives. He states his psychologist is in [place].  Pt states he had 2 beers today.  PT states the psychiatrist prescribes duloxetine and xanax. Pt deneis any recent illness/ fever/chills. Reports normal PO intake. Pt currently living in [place]. Pt's last drink was at noon . Pt denies any symptoms of withdrawal. 38yoM with h/o seizures ( gets one every 6 months), TBI, not followed by neurology , c/o " possible seizure" today. Pt denies any tongue biting/ urinary incontinece.  States he had a seizure in a store and that the store keepers told him he had one.  Pt denies any seizure medication.  PMD: Dr. Gracia.  Pt states he was here a few weeks ago also for seizure.  Pt states he has not followed up with neurology because he does not know of a seizure near where he lives. He states his psychologist is in [place].  Pt states he had 2 beers today.  PT states the psychiatrist prescribes duloxetine and xanax. Pt deneis any recent illness/ fever/chills. Reports normal PO intake. Pt currently living in [place]. Pt's last drink was at noon . Pt denies any symptoms of withdrawal. 38yoM with h/o seizures ( gets one every 6 months), TBI, not followed by neurology , c/o " possible seizure" today. Pt denies any tongue biting/ urinary incontinence.  States he had a seizure in a store and that the store keepers told him he had one.  Pt denies any seizure medication.  PMD: Dr. Gracia.  Pt states he was here a few weeks ago also for seizure.  Pt states he has not followed up with neurology because he does not know of a seizure near where he lives. He states his psychologist is in [place].  Pt states he had 2 beers today.  PT states the psychiatrist prescribes duloxetine and xanax. Pt deneis any recent illness/ fever/chills. Reports normal PO intake. Pt currently living in [place]. Pt's last drink was at noon . Pt denies any symptoms of withdrawal.

## 2018-12-03 NOTE — ED PROVIDER NOTE - ENMT, MLM
Airway patent, Nasal mucosa clear. Mouth with normal mucosa. Throat has no vesicles, no oropharyngeal exudates and uvula is midline. No tongue fasciculations

## 2018-12-03 NOTE — ED ADULT NURSE NOTE - NSIMPLEMENTINTERV_GEN_ALL_ED
Implemented All Fall Risk Interventions:  [place] to call system. Call bell, personal items and telephone within reach. Instruct patient to call for assistance. Room bathroom lighting operational. Non-slip footwear when patient is off stretcher. Physically safe environment: no spills, clutter or unnecessary equipment. Stretcher in lowest position, wheels locked, appropriate side rails in place. Provide visual cue, wrist band, yellow gown, etc. Monitor gait and stability. Monitor for mental status changes and reorient to person, place, and time. Review medications for side effects contributing to fall risk. Reinforce activity limits and safety measures with patient and family.

## 2018-12-03 NOTE — ED PROVIDER NOTE - PROGRESS NOTE DETAILS
pt asking to go home;  I discussed seizures with Dr. Hawk and he recommends that we start patient on 500mg BID keppra with outpt f/u with him. Pt understands risk of seizure especially with alcohol; does not drive. Ensures he will take the medication and f/u.

## 2018-12-03 NOTE — ED PROVIDER NOTE - MEDICAL DECISION MAKING DETAILS
38yoM with h/o TBI, previous seizures, not on AEM, pw presumed seizure today. No external signs of trauma; pt not clinically intoxicated or complaining of or displaying s/o alcohol withdrawal. Plan to check labs, CTh, d/w neurology for possible initiation of AEMs.

## 2018-12-03 NOTE — ED ADULT NURSE NOTE - CHIEF COMPLAINT QUOTE
pt arrive by EMS from [facility] with reports by EMS of taking Ambien and drinking alcohol. pt denies these claims, states "I had a seizure an hour and half ago"

## 2019-01-22 ENCOUNTER — EMERGENCY (EMERGENCY)
Facility: HOSPITAL | Age: 39
LOS: 1 days | Discharge: HOME | End: 2019-01-22
Attending: STUDENT IN AN ORGANIZED HEALTH CARE EDUCATION/TRAINING PROGRAM
Payer: MEDICAID

## 2019-01-22 VITALS
SYSTOLIC BLOOD PRESSURE: 107 MMHG | DIASTOLIC BLOOD PRESSURE: 76 MMHG | TEMPERATURE: 97 F | HEART RATE: 104 BPM | RESPIRATION RATE: 20 BRPM | OXYGEN SATURATION: 98 %

## 2019-01-22 VITALS
RESPIRATION RATE: 20 BRPM | OXYGEN SATURATION: 96 % | HEIGHT: 67 IN | DIASTOLIC BLOOD PRESSURE: 80 MMHG | TEMPERATURE: 98 F | HEART RATE: 84 BPM | SYSTOLIC BLOOD PRESSURE: 113 MMHG | WEIGHT: 184.97 LBS

## 2019-01-22 DIAGNOSIS — Z98.890 OTHER SPECIFIED POSTPROCEDURAL STATES: Chronic | ICD-10-CM

## 2019-01-22 PROCEDURE — 99284 EMERGENCY DEPT VISIT MOD MDM: CPT

## 2019-01-22 PROCEDURE — 99285 EMERGENCY DEPT VISIT HI MDM: CPT

## 2019-01-22 RX ADMIN — Medication 50 MILLIGRAM(S): at 20:23

## 2019-01-22 RX ADMIN — Medication 50 MILLIGRAM(S): at 17:19

## 2019-01-22 NOTE — ED PROVIDER NOTE - MEDICAL DECISION MAKING DETAILS
Patient with acute alcohol use without indication of withdrawal. Patient observed and has remained stable during ED observation.

## 2019-01-22 NOTE — ED PROVIDER NOTE - CHPI ED SYMPTOMS NEG
no abdominal distension/no fever/no disorientation/no chills/no nausea/no weakness/no abdominal pain/no confusion/no vomiting/no pain

## 2019-01-22 NOTE — ED PROVIDER NOTE - OBJECTIVE STATEMENT
The patient is a 38 year old male presents with alcohol intoxication and denies any complaints. No HA, No CP, No SOB, No abd pain
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
## 2019-01-22 NOTE — ED ADULT NURSE NOTE - NSIMPLEMENTINTERV_GEN_ALL_ED
Implemented All Fall Risk Interventions:  [place] to call system. Call bell, personal items and telephone within reach. Instruct patient to call for assistance. Room bathroom lighting operational. Non-slip footwear when patient is off stretcher. Physically safe environment: no spills, clutter or unnecessary equipment. Stretcher in lowest position, wheels locked, appropriate side rails in place. Provide visual cue, wrist band, yellow gown, etc. Monitor gait and stability. Monitor for mental status changes and reorient to person, place, and time. Review medications for side effects contributing to fall risk. Reinforce activity limits and safety measures with patient and family.

## 2019-02-21 ENCOUNTER — EMERGENCY (EMERGENCY)
Facility: HOSPITAL | Age: 39
LOS: 1 days | Discharge: HOME | End: 2019-02-21
Attending: EMERGENCY MEDICINE
Payer: MEDICAID

## 2019-02-21 VITALS
HEART RATE: 88 BPM | DIASTOLIC BLOOD PRESSURE: 78 MMHG | OXYGEN SATURATION: 97 % | TEMPERATURE: 98 F | RESPIRATION RATE: 18 BRPM | SYSTOLIC BLOOD PRESSURE: 104 MMHG

## 2019-02-21 DIAGNOSIS — Z98.890 OTHER SPECIFIED POSTPROCEDURAL STATES: Chronic | ICD-10-CM

## 2019-02-21 PROCEDURE — 99284 EMERGENCY DEPT VISIT MOD MDM: CPT

## 2019-02-21 PROCEDURE — 99285 EMERGENCY DEPT VISIT HI MDM: CPT

## 2019-02-21 NOTE — ED ADULT NURSE REASSESSMENT NOTE - NS ED NURSE REASSESS COMMENT FT1
Pt returned from CT scan. Pt remains sleeping in stretcher in view of nurses station. In no apparent distress, arousable to voice. Will continue to closely monitor.

## 2019-02-21 NOTE — ED PROVIDER NOTE - PROGRESS NOTE DETAILS
Case s/o to Dr Briceño for sobriety Pt observed in ED.  Awake and alert at this time.  Ambulatory with steady gait and stable for d/c

## 2019-02-21 NOTE — ED ADULT NURSE REASSESSMENT NOTE - NS ED NURSE REASSESS COMMENT FT1
Pt remains sleeping in stretcher in view of nurses station. In no apparent distress, arousable to touch. Able to state name and birth date. Will continue to closely monitor.

## 2019-02-21 NOTE — ED ADULT TRIAGE NOTE - CHIEF COMPLAINT QUOTE
Patient BIBA, found outside deli intoxicated, patient admits to drinking "2 beers", patient slurring words, appears unkept.

## 2019-02-21 NOTE — ED PROVIDER NOTE - CHPI ED SYMPTOMS NEG
no abdominal pain/no disorientation/no fever/no vomiting/no nausea/no pain/no abdominal distension/no confusion/no chills/no weakness

## 2019-02-21 NOTE — ED ADULT NURSE REASSESSMENT NOTE - NS ED NURSE REASSESS COMMENT FT1
this RN found patient with prescription xanax 0.5. Pt with one pill in mouth, pt spit it out. Two on gown. MD Sandra made aware. Xanax to be locked up with security.

## 2019-02-22 VITALS
TEMPERATURE: 98 F | DIASTOLIC BLOOD PRESSURE: 76 MMHG | HEART RATE: 67 BPM | OXYGEN SATURATION: 98 % | RESPIRATION RATE: 18 BRPM | SYSTOLIC BLOOD PRESSURE: 110 MMHG

## 2019-06-16 ENCOUNTER — EMERGENCY (EMERGENCY)
Facility: HOSPITAL | Age: 39
LOS: 1 days | Discharge: HOME | End: 2019-06-16
Attending: EMERGENCY MEDICINE
Payer: MEDICAID

## 2019-06-16 VITALS
HEIGHT: 68 IN | TEMPERATURE: 98 F | OXYGEN SATURATION: 98 % | RESPIRATION RATE: 18 BRPM | SYSTOLIC BLOOD PRESSURE: 111 MMHG | HEART RATE: 100 BPM | DIASTOLIC BLOOD PRESSURE: 74 MMHG | WEIGHT: 160.06 LBS

## 2019-06-16 DIAGNOSIS — Z98.890 OTHER SPECIFIED POSTPROCEDURAL STATES: Chronic | ICD-10-CM

## 2019-06-16 LAB
ALBUMIN SERPL ELPH-MCNC: 4.2 G/DL — SIGNIFICANT CHANGE UP (ref 3.3–5.2)
ALP SERPL-CCNC: 85 U/L — SIGNIFICANT CHANGE UP (ref 40–120)
ALT FLD-CCNC: 15 U/L — SIGNIFICANT CHANGE UP
AMPHET UR-MCNC: NEGATIVE — SIGNIFICANT CHANGE UP
ANION GAP SERPL CALC-SCNC: 16 MMOL/L — SIGNIFICANT CHANGE UP (ref 5–17)
AST SERPL-CCNC: 22 U/L — SIGNIFICANT CHANGE UP
BARBITURATES UR SCN-MCNC: NEGATIVE — SIGNIFICANT CHANGE UP
BASOPHILS NFR BLD AUTO: 3 % — HIGH (ref 0–2)
BENZODIAZ UR-MCNC: POSITIVE
BILIRUB SERPL-MCNC: 0.9 MG/DL — SIGNIFICANT CHANGE UP (ref 0.4–2)
BUN SERPL-MCNC: 30 MG/DL — HIGH (ref 8–20)
CALCIUM SERPL-MCNC: 8.9 MG/DL — SIGNIFICANT CHANGE UP (ref 8.6–10.2)
CHLORIDE SERPL-SCNC: 103 MMOL/L — SIGNIFICANT CHANGE UP (ref 98–107)
CO2 SERPL-SCNC: 21 MMOL/L — LOW (ref 22–29)
COCAINE METAB.OTHER UR-MCNC: NEGATIVE — SIGNIFICANT CHANGE UP
CREAT SERPL-MCNC: 0.76 MG/DL — SIGNIFICANT CHANGE UP (ref 0.5–1.3)
EOSINOPHIL NFR BLD AUTO: 2 % — SIGNIFICANT CHANGE UP (ref 0–6)
ETHANOL SERPL-MCNC: <10 MG/DL — SIGNIFICANT CHANGE UP
GLUCOSE SERPL-MCNC: 73 MG/DL — SIGNIFICANT CHANGE UP (ref 70–115)
HCT VFR BLD CALC: 41.3 % — LOW (ref 42–52)
HGB BLD-MCNC: 14.1 G/DL — SIGNIFICANT CHANGE UP (ref 14–18)
LYMPHOCYTES # BLD AUTO: 14 % — LOW (ref 20–55)
MCHC RBC-ENTMCNC: 32.6 PG — HIGH (ref 27–31)
MCHC RBC-ENTMCNC: 34.1 G/DL — SIGNIFICANT CHANGE UP (ref 32–36)
MCV RBC AUTO: 95.6 FL — HIGH (ref 80–94)
METHADONE UR-MCNC: NEGATIVE — SIGNIFICANT CHANGE UP
MONOCYTES NFR BLD AUTO: 16 % — HIGH (ref 3–10)
NEUTROPHILS NFR BLD AUTO: 65 % — SIGNIFICANT CHANGE UP (ref 37–73)
OPIATES UR-MCNC: POSITIVE
PCP SPEC-MCNC: SIGNIFICANT CHANGE UP
PCP UR-MCNC: NEGATIVE — SIGNIFICANT CHANGE UP
PLAT MORPH BLD: NORMAL — SIGNIFICANT CHANGE UP
PLATELET # BLD AUTO: 377 K/UL — SIGNIFICANT CHANGE UP (ref 150–400)
POTASSIUM SERPL-MCNC: 4.2 MMOL/L — SIGNIFICANT CHANGE UP (ref 3.5–5.3)
POTASSIUM SERPL-SCNC: 4.2 MMOL/L — SIGNIFICANT CHANGE UP (ref 3.5–5.3)
PROT SERPL-MCNC: 6.6 G/DL — SIGNIFICANT CHANGE UP (ref 6.6–8.7)
RBC # BLD: 4.32 M/UL — LOW (ref 4.6–6.2)
RBC # FLD: 12.7 % — SIGNIFICANT CHANGE UP (ref 11–15.6)
RBC BLD AUTO: NORMAL — SIGNIFICANT CHANGE UP
SODIUM SERPL-SCNC: 140 MMOL/L — SIGNIFICANT CHANGE UP (ref 135–145)
THC UR QL: NEGATIVE — SIGNIFICANT CHANGE UP
WBC # BLD: 11.1 K/UL — HIGH (ref 4.8–10.8)
WBC # FLD AUTO: 11.1 K/UL — HIGH (ref 4.8–10.8)

## 2019-06-16 PROCEDURE — 82962 GLUCOSE BLOOD TEST: CPT

## 2019-06-16 PROCEDURE — 80053 COMPREHEN METABOLIC PANEL: CPT

## 2019-06-16 PROCEDURE — 85027 COMPLETE CBC AUTOMATED: CPT

## 2019-06-16 PROCEDURE — 99284 EMERGENCY DEPT VISIT MOD MDM: CPT

## 2019-06-16 PROCEDURE — 36415 COLL VENOUS BLD VENIPUNCTURE: CPT

## 2019-06-16 PROCEDURE — 99285 EMERGENCY DEPT VISIT HI MDM: CPT

## 2019-06-16 PROCEDURE — 70450 CT HEAD/BRAIN W/O DYE: CPT | Mod: 26

## 2019-06-16 PROCEDURE — 70450 CT HEAD/BRAIN W/O DYE: CPT

## 2019-06-16 PROCEDURE — 80307 DRUG TEST PRSMV CHEM ANLYZR: CPT
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
## 2019-06-16 NOTE — ED ADULT NURSE NOTE - NSIMPLEMENTINTERV_GEN_ALL_ED
Implemented All Fall Risk Interventions:  [place] to call system. Call bell, personal items and telephone within reach. Instruct patient to call for assistance. Room bathroom lighting operational. Non-slip footwear when patient is off stretcher. Physically safe environment: no spills, clutter or unnecessary equipment. Stretcher in lowest position, wheels locked, appropriate side rails in place. Provide visual cue, wrist band, yellow gown, etc. Monitor gait and stability. Monitor for mental status changes and reorient to person, place, and time. Review medications for side effects contributing to fall risk. Reinforce activity limits and safety measures with patient and family.
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
## 2019-06-16 NOTE — ED PROVIDER NOTE - CARE PLAN
Principal Discharge DX:	Housing situation unstable  Secondary Diagnosis:	TBI (traumatic brain injury)
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
## 2019-06-16 NOTE — ED ADULT NURSE REASSESSMENT NOTE - NS ED NURSE REASSESS COMMENT FT1
CANDIDO Freedman at bedside to update pt.  on POC; pt. verbalizing understanding Clear bilaterally, pupils equal, round and reactive to light.

## 2019-06-16 NOTE — ED ADULT NURSE REASSESSMENT NOTE - NS ED NURSE REASSESS COMMENT FT1
report received from Ye FONG.  Pt resting comfortably on stretcher.  No complaints of pain.  Respirations even and unlabored.  Awaiting SW plan for dispo.  Awaiting CT head results.   In NAD, will continue to monitor.

## 2019-06-16 NOTE — ED ADULT TRIAGE NOTE - CHIEF COMPLAINT QUOTE
Patient BIBA, sent from sober house patient resides in, as per EMS staff stated patient was drinking alcohol today, patient denies drinking alcohol, offering no complaints

## 2019-06-17 VITALS
SYSTOLIC BLOOD PRESSURE: 120 MMHG | HEART RATE: 82 BPM | RESPIRATION RATE: 18 BRPM | OXYGEN SATURATION: 98 % | DIASTOLIC BLOOD PRESSURE: 62 MMHG

## 2019-06-17 NOTE — CHART NOTE - NSCHARTNOTEFT_GEN_A_CORE
SOCIAL WORK NOTE:  THIS WORKER MET WITH PATIENT THIS MORNING.  PATIENT UNDERSTANDS HIS PLAN TO GO TO [facility] THIS MORNING AND IS AWARE THAT HE NEEDS TO ARRIVE PRIOR TO 3 PM IN ORDER TO BE PLACED.  DISCUSSED BUS TICKETS AND ROUTES WITH THE PATIENT AS HAD TO OFFER EDUCATION THAT MEDICAID DOES NOT APPROVE FOR MEDICAID TAXIS TO [facility].  PATIENT CONFIRMED HE HAS USED BUSES BEFORE AND THAT HE WOULD BE FINE TAKING A BUS AS LONG AS HE KNOWS WHERE HE IS GOING.  THIS WORKER PROVIDED THE PATIENT WITH BUS TICKETS, ROUTES AND MAPS.  PATIENT ACCEPTED AND APPRECIATIVE. RN AWARE PATIENT READY FOR DISCHARGE.

## 2019-06-17 NOTE — ED ADULT NURSE REASSESSMENT NOTE - NS ED NURSE REASSESS COMMENT FT1
Pt sleeping comfortably on stretcher.  No nonverbal indicators of pain present.  Respirations even and unlabored.  Awaiting further POC.  PIV wnl; flushing without difficulty.  In NAD, will continue to monitor.

## 2019-06-17 NOTE — ED ADULT NURSE REASSESSMENT NOTE - NS ED NURSE REASSESS COMMENT FT1
pt. received from night RN. pt. a&ox3. pt. denies pain or discomfort at this time. pt. informed on plan of care. awaiting SW placement.

## 2019-09-01 PROCEDURE — G9005: CPT

## 2019-09-01 PROCEDURE — G9001: CPT

## 2020-01-06 NOTE — ED ADULT NURSE NOTE - CAS EDP DISCH TYPE
History of Present Illness


Admission Date/PCP: 


  19 00:54





  JOSE ROWLAND NP





History of Present Illness: 





Provider Note


Provider Note: 


History and Physical


Patient Name: SOPHY SHAFER                Medical Record Number: [id number]


YOB: 1946                              Patient Status: Inpatient


Attending Provider: ADELA DUMONT JR              Account Number: [id number]


Date: 19 12:30                         Initialization Date: 19 12:30








 Orthopedic History and Physical 


Date: 19


Attending physician:: ADELA DUMONT JR


Provider Consulted: ADELA DUMONT JR


Consult reason:: History and Physical.





 History of Present Illness 


Admission Date/PCP: 


  19 00:54





  JOSE ROWLAND NP





Patient complains of: Left shoulder pain, left hip pain


History of Present Illness: 


SOPHY SHAFER is a 73 year old female who was brought to the emergency 

department after a fall at home.  She is unsure exactly how she fell but she 

tripped and landed on her left side, resulting in both severe left shoulder pain

and 8 out of 10 left hip pain leading to the inability to ambulate.  Pain is 

constant and worse with any motion.  Pain medication improves the pain.  She was

brought to the hospital and x-rays were taken that demonstrate both a dislocated

left shoulder as well as a left intertrochanteric hip fracture.  The emergency 

department reduced her shoulder and subsequent films show a concentrically 

reduced shoulder.  She denies prior loss of consciousness head injury or 

syncopal-like episode,  she denies current altered sensorium.








 Past Medical History 


Cardiac Medical History: Reports: Myocardial Infarction - 2015 MINOR HEART 

ATTACK R/T STRESS PER PT


   Denies: Coronary Artery Disease, Hypertension


Pulmonary Medical History: Reports: Pneumonia - YRS AGO


   Denies: Asthma, Bronchitis, Chronic Obstructive Pulmonary Disease (COPD)


Neurological Medical History: Reports: None


   Denies: Seizures


Endocrine Medical History: Reports: None


Renal/ Medical History: Reports: None


Malignancy Medical History: Reports: None


GI Medical History: Reports: None


Musculoskeltal Medical History: 


   Denies: Arthritis


Skin Medical History: Reports: None


Psychiatric Medical History: Reports: None


Traumatic Medical History: Reports: None


Hematology: 


   Denies: Anemia


Infectious Medical History: Reports: None





 Social History 


Lives with: Alone


Smoking Status: Current Every Day Smoker


Cigarettes Packs Per Day: 1


Number of Years Smokin


Frequency of Alcohol Use: Occasional


Hx Recreational Drug Use: No





- Advance Directive


Resuscitation Status: Full Code





 Family History 


Family History: Reviewed & Not Pertinent


Parental Family History Reviewed: No


Children Family History Reviewed: NA


Sibling(s) Family History Reviewed.: NA





 Medication/Allergy 


Home Medications: 








Atorvastatin Calcium [Lipitor 10 mg Tablet] 10 mg PO ASDIR 18 


Ibuprofen 200 mg PO DAILYP PRN 19 


Lisinopril [Prinivil 2.5 mg Tablet] 2.5 mg PO DAILYP PRN 19 








Allergies/Adverse Reactions: 


                                        





erythromycin base Allergy (Verified 12/15/18 09:11)


   STOMACH PAIN


Sulfa (Sulfonamide Antibiotics) Allergy (Verified 12/15/18 09:11)


   











 Review of Systems 


Review of Systems: 





Constitutional: ABSENT: anorexia, chills, night sweats


Cardiovascular: ABSENT: chest pain


Respiratory: ABSENT: dyspnea


Gastrointestinal: ABSENT: vomiting


Genitourinary: ABSENT: dysuria


Integumentary: ABSENT: rash


Neurological: ABSENT: confusion, memory loss, numbness


Psychiatric: ABSENT: hallucinations


Hematologic/Lymphatic: ABSENT: easy bleeding








 Physical Exam 


Vital Signs: 


                                        











Temp Pulse Resp BP Pulse Ox


 


 97.2 F   83   19   159/74 H  96 


 


 19 11:03  19 11:03  19 11:03  19 11:03  19 11:03








                                 Intake & Output











 19





 06:59 06:59 06:59


 


Output Total  550 


 


Balance  -550 


 


Weight  60.3 kg 











Physical Exam: 





General appearance: PRESENT: no acute distress, cooperative, well-nourished


Head exam: PRESENT: atraumatic, normocephalic


Eye exam: PRESENT: EOMI


Ear exam: PRESENT: normal external ear exam


Mouth exam: PRESENT: neck supple


Neck exam: ABSENT: tracheal deviation


Respiratory exam: PRESENT: symmetrical, unlabored.  ABSENT: accessory muscle 

use, wheezes


Pulses: PRESENT: normal radial pulses, normal dorsalis pedis pulse


Vascular exam: PRESENT: normal capillary refill


GI/Abdominal exam: ABSENT: distended, firm


Extremities exam: PRESENT: full ROM of bilateral shoulders, elbows wrists, 

knees, hips and ankles without pain


Musculoskeletal exam: PRESENT: full ROM, normal inspection of all 4 extremities 

aside from that noted below. 


Neurological exam: PRESENT: alert, awake, oriented to person, oriented to place,

oriented to time


Psychiatric exam: PRESENT: appropriate affect.  ABSENT: agitated


Focused psych exam: ABSENT: catatonic


Skin exam: PRESENT: intact.  ABSENT: dry





All as above aside from that noted in the HPI and the following:


LUE


Left upper extremity sensation grossly intact to radial median and ulnar nerve.


Left upper extremity motor function grossly intact to radian median ulnar nerve 

AIN and PIN


Pulses 2+, capillary refill less than 2 seconds


No deformity noted full range of motion of the elbow wrist and fingers without 

pain


Compartments soft, no tenderness to palpation 


skin intact


Left shoulder was in sling at time of exam, some tenderness to palpation about 

the shoulder.





Left lower extremity


-Pulses 2+ distally


-Compartments soft


-Sensation grossly intact to L3-4-5 S1


-Motor grossly intact to EHL TA gastroc and quad


-Pain with any range of motion of the left hip





 Results 


Laboratory Results: 


                                        





                                 19 23:56 





                                 19 23:56 





                                        











  19





  23:56 23:56 23:56


 


WBC  11.1 H  


 


RBC  3.87  


 


Hgb  12.9  


 


Hct  38.4  


 


MCV  99 H  


 


MCH  33.4  


 


MCHC  33.7  


 


RDW  13.9  


 


Plt Count  274  


 


Seg Neutrophils %  80.6 H  


 


Sodium   140.5 


 


Potassium   4.1 


 


Chloride   105 


 


Carbon Dioxide   21 L 


 


Anion Gap   15 


 


BUN   14 


 


Creatinine   0.69 


 


Est GFR ( Amer)   > 60 


 


Glucose   116 H 


 


Calcium   9.2 


 


Total Bilirubin   0.2 


 


AST   27 


 


Alkaline Phosphatase   85 


 


Total Protein   7.0 


 


Albumin   4.2 


 


Urine Color   


 


Urine Appearance   


 


Urine pH   


 


Ur Specific Gravity   


 


Urine Protein   


 


Urine Glucose (UA)   


 


Urine Ketones   


 


Urine Blood   


 


Urine Nitrite   


 


Ur Leukocyte Esterase   


 


Urine WBC (Auto)   


 


Urine RBC (Auto)   


 


Blood Type    O POSITIVE


 


Antibody Screen    NEGATIVE














  19





  00:12


 


WBC 


 


RBC 


 


Hgb 


 


Hct 


 


MCV 


 


MCH 


 


MCHC 


 


RDW 


 


Plt Count 


 


Seg Neutrophils % 


 


Sodium 


 


Potassium 


 


Chloride 


 


Carbon Dioxide 


 


Anion Gap 


 


BUN 


 


Creatinine 


 


Est GFR (African Amer) 


 


Glucose 


 


Calcium 


 


Total Bilirubin 


 


AST 


 


Alkaline Phosphatase 


 


Total Protein 


 


Albumin 


 


Urine Color  STRAW


 


Urine Appearance  CLEAR


 


Urine pH  5.0


 


Ur Specific Gravity  1.006


 


Urine Protein  NEGATIVE


 


Urine Glucose (UA)  50 H


 


Urine Ketones  NEGATIVE


 


Urine Blood  SMALL H


 


Urine Nitrite  NEGATIVE


 


Ur Leukocyte Esterase  MODERATE H


 


Urine WBC (Auto)  6


 


Urine RBC (Auto)  7


 


Blood Type 


 


Antibody Screen 








                                        











  19





  23:56 23:56


 


Creatine Kinase  220 H 


 


Troponin I   < 0.012











Impressions: 


                                        





Hip X-Ray  19 21:23


IMPRESSION:


 


Left subcapital hip fracture as above


 


 


copyright  [employer]- All Rights Reserved


 








Chest X-Ray  19 23:13


IMPRESSION:


 


No acute cardiopulmonary process


 


 


copyright  [employer]- All Rights Reserved


 








Shoulder X-Ray  19 01:20


IMPRESSION:


 


1. Interval reduction of the left shoulder dislocation.


2. Mildly displaced fracture through the greater tuberosity.


3. Mild diffuse bone demineralization. 


 














 Assessment & Plan 





- Diagnosis


(1) Hip fracture, left


Plan: 


At this time we will plan for left hip hemiarthroplasty to be performed on 

2019 after a medical evaluation and clearance.


-The patient is to be made n.p.o. at midnight tonight


-Bed rest


-Hold all chemical DVT prophylaxis at midnight tonight


-Ancef on-call for the OR


-Multimodal pain management








(2) Shoulder fracture, left


Plan: 


She has a left shoulder fracture dislocation with a greater tuberosity fracture.

 This appeared to reduce well with her shoulder reduction and may heal in p

osition.  We will allow her to continue in the sling until further evaluation.  

I will get an axillary view to confirm reduction





Past Medical History


Cardiac Medical History: Reports: Myocardial Infarction - 2015 MINOR HEART 

ATTACK R/T STRESS PER PT


   Denies: Coronary Artery Disease, Hypertension


Pulmonary Medical History: Reports: Pneumonia - YRS AGO


   Denies: Asthma, Bronchitis, Chronic Obstructive Pulmonary Disease (COPD)


Neurological Medical History: Reports: None


   Denies: Seizures


Endocrine Medical History: Reports: None


Renal/ Medical History: Reports: None


Malignancy Medical History: Reports: None


GI Medical History: Reports: None


Musculoskeltal Medical History: 


   Denies: Arthritis


Skin Medical History: Reports: None


Psychiatric Medical History: Reports: None


Traumatic Medical History: Reports: None


Hematology: 


   Denies: Anemia


Infectious Medical History: Reports: None





Past Surgical History


Past Surgical History: Reports: None





Social History


Lives with: Alone


Smoking Status: Current Every Day Smoker


Cigarettes Packs Per Day: 0.5


Number of Years Smokin


Frequency of Alcohol Use: Occasional


Hx Recreational Drug Use: No


Hx Prescription Drug Abuse: No





- Advance Directive


Resuscitation Status: Full Code





Family History


Family History: Reviewed & Not Pertinent


Parental Family History Reviewed: No


Children Family History Reviewed: NA


Sibling(s) Family History Reviewed.: NA





Medication/Allergy


Home Medications: 








Atorvastatin Calcium [Lipitor 10 mg Tablet] 10 mg PO ASDIR 18 


Ibuprofen 200 mg PO DAILYP PRN 19 


Lisinopril [Prinivil 2.5 mg Tablet] 2.5 mg PO DAILYP PRN 19 


Acetaminophen [Tylenol 325 mg Tablet] 650 mg PO Q6  tablet 20 


Aspirin [Aspirin 325 mg Tablet] 325 mg PO DAILY #42 tablet 20 


Oxycodone HCl [Oxy-Ir 5 mg Tablet] 5 mg PO Q4HP PRN #30 tablet 20 


Tramadol HCl [Ultram 50 mg Tablet] 50 mg PO Q6HP PRN #30 tablet 20 








Allergies/Adverse Reactions: 


                                        





erythromycin base Allergy (Verified 12/15/18 09:11)


   STOMACH PAIN


Sulfa (Sulfonamide Antibiotics) Allergy (Verified 12/15/18 09:11)


   











Physical Exam


Vital Signs: 


                                        











Temp Pulse Resp BP Pulse Ox


 


 98.3 F   86   18   125/61   94 


 


 20 15:28  20 15:28  20 15:28  20 15:28  20 15:28














Results


Laboratory Results: 


                                        





                                 20 06:40 





                                 19 04:00 





                                        











  19





  23:56 23:56


 


Creatine Kinase  220 H 


 


Troponin I   < 0.012











Impressions: 


                                        





Chest X-Ray  19 23:13


IMPRESSION:


 


No acute cardiopulmonary process


 


 


copyright  [employer]- All Rights Reserved


 








Shoulder X-Ray  19 01:20


IMPRESSION:


 


1. Interval reduction of the left shoulder dislocation.


2. Mildly displaced fracture through the greater tuberosity.


3. Mild diffuse bone demineralization. 


 








Fluoroscopy  19 00:00


IMPRESSION:  IMAGE(S) OBTAINED DURING PROCEDURE.


 








Hip X-Ray  19 00:00


IMPRESSION:  IMAGE(S) OBTAINED DURING PROCEDURE.


 








Pelvis X-Ray  19 00:00


IMPRESSION:   SATISFACTORY POSTOPERATIVE LEFT HIP.


 














Assessment & Plan





- Diagnosis


(1) Hip fracture, left


Qualifiers: 


   Encounter type: initial encounter   Fracture type: closed   Qualified 

Code(s): S72.002A - Fracture of unspecified part of neck of left femur, initial 

encounter for closed fracture   


Is this a current diagnosis for this admission?: Yes   





(2) Shoulder fracture, left


Qualifiers: 


   Encounter type: initial encounter   Fracture type: closed   Qualified 

Code(s): S42.92XA - Fracture of left shoulder girdle, part unspecified, initial 

encounter for closed fracture   


Is this a current diagnosis for this admission?: Yes Home

## 2020-05-01 ENCOUNTER — OUTPATIENT (OUTPATIENT)
Dept: OUTPATIENT SERVICES | Facility: HOSPITAL | Age: 40
LOS: 1 days | End: 2020-05-01
Payer: MEDICAID

## 2020-05-01 DIAGNOSIS — Z98.890 OTHER SPECIFIED POSTPROCEDURAL STATES: Chronic | ICD-10-CM

## 2020-05-01 PROCEDURE — G9001: CPT

## 2020-05-02 ENCOUNTER — EMERGENCY (EMERGENCY)
Facility: HOSPITAL | Age: 40
LOS: 1 days | Discharge: HOME | End: 2020-05-02
Attending: EMERGENCY MEDICINE
Payer: MEDICAID

## 2020-05-02 VITALS
SYSTOLIC BLOOD PRESSURE: 115 MMHG | HEIGHT: 68 IN | OXYGEN SATURATION: 98 % | DIASTOLIC BLOOD PRESSURE: 78 MMHG | WEIGHT: 160.06 LBS | TEMPERATURE: 98 F | RESPIRATION RATE: 20 BRPM | HEART RATE: 89 BPM

## 2020-05-02 VITALS
TEMPERATURE: 98 F | DIASTOLIC BLOOD PRESSURE: 79 MMHG | HEART RATE: 69 BPM | SYSTOLIC BLOOD PRESSURE: 127 MMHG | OXYGEN SATURATION: 99 %

## 2020-05-02 DIAGNOSIS — Z98.890 OTHER SPECIFIED POSTPROCEDURAL STATES: Chronic | ICD-10-CM

## 2020-05-02 LAB
ALBUMIN SERPL ELPH-MCNC: 4.2 G/DL — SIGNIFICANT CHANGE UP (ref 3.3–5.2)
ALP SERPL-CCNC: 62 U/L — SIGNIFICANT CHANGE UP (ref 40–120)
ALT FLD-CCNC: 9 U/L — SIGNIFICANT CHANGE UP
AMPHET UR-MCNC: NEGATIVE — SIGNIFICANT CHANGE UP
ANION GAP SERPL CALC-SCNC: 14 MMOL/L — SIGNIFICANT CHANGE UP (ref 5–17)
APPEARANCE UR: CLEAR — SIGNIFICANT CHANGE UP
AST SERPL-CCNC: 17 U/L — SIGNIFICANT CHANGE UP
BARBITURATES UR SCN-MCNC: NEGATIVE — SIGNIFICANT CHANGE UP
BASOPHILS # BLD AUTO: 0.15 K/UL — SIGNIFICANT CHANGE UP (ref 0–0.2)
BASOPHILS NFR BLD AUTO: 2.1 % — HIGH (ref 0–2)
BENZODIAZ UR-MCNC: NEGATIVE — SIGNIFICANT CHANGE UP
BILIRUB SERPL-MCNC: 0.5 MG/DL — SIGNIFICANT CHANGE UP (ref 0.4–2)
BILIRUB UR-MCNC: NEGATIVE — SIGNIFICANT CHANGE UP
BUN SERPL-MCNC: 14 MG/DL — SIGNIFICANT CHANGE UP (ref 8–20)
CALCIUM SERPL-MCNC: 8.6 MG/DL — SIGNIFICANT CHANGE UP (ref 8.6–10.2)
CHLORIDE SERPL-SCNC: 107 MMOL/L — SIGNIFICANT CHANGE UP (ref 98–107)
CO2 SERPL-SCNC: 25 MMOL/L — SIGNIFICANT CHANGE UP (ref 22–29)
COCAINE METAB.OTHER UR-MCNC: NEGATIVE — SIGNIFICANT CHANGE UP
COLOR SPEC: YELLOW — SIGNIFICANT CHANGE UP
CREAT SERPL-MCNC: 0.67 MG/DL — SIGNIFICANT CHANGE UP (ref 0.5–1.3)
DIFF PNL FLD: NEGATIVE — SIGNIFICANT CHANGE UP
EOSINOPHIL # BLD AUTO: 0.31 K/UL — SIGNIFICANT CHANGE UP (ref 0–0.5)
EOSINOPHIL NFR BLD AUTO: 4.3 % — SIGNIFICANT CHANGE UP (ref 0–6)
ETHANOL SERPL-MCNC: 97 MG/DL — SIGNIFICANT CHANGE UP
GLUCOSE SERPL-MCNC: 82 MG/DL — SIGNIFICANT CHANGE UP (ref 70–99)
GLUCOSE UR QL: NEGATIVE MG/DL — SIGNIFICANT CHANGE UP
HCT VFR BLD CALC: 43.3 % — SIGNIFICANT CHANGE UP (ref 39–50)
HGB BLD-MCNC: 14.7 G/DL — SIGNIFICANT CHANGE UP (ref 13–17)
IMM GRANULOCYTES NFR BLD AUTO: 0.3 % — SIGNIFICANT CHANGE UP (ref 0–1.5)
KETONES UR-MCNC: ABNORMAL
LEUKOCYTE ESTERASE UR-ACNC: NEGATIVE — SIGNIFICANT CHANGE UP
LIDOCAIN IGE QN: 12 U/L — LOW (ref 22–51)
LYMPHOCYTES # BLD AUTO: 2.22 K/UL — SIGNIFICANT CHANGE UP (ref 1–3.3)
LYMPHOCYTES # BLD AUTO: 30.7 % — SIGNIFICANT CHANGE UP (ref 13–44)
MCHC RBC-ENTMCNC: 33.4 PG — SIGNIFICANT CHANGE UP (ref 27–34)
MCHC RBC-ENTMCNC: 33.9 GM/DL — SIGNIFICANT CHANGE UP (ref 32–36)
MCV RBC AUTO: 98.4 FL — SIGNIFICANT CHANGE UP (ref 80–100)
METHADONE UR-MCNC: NEGATIVE — SIGNIFICANT CHANGE UP
MONOCYTES # BLD AUTO: 0.75 K/UL — SIGNIFICANT CHANGE UP (ref 0–0.9)
MONOCYTES NFR BLD AUTO: 10.4 % — SIGNIFICANT CHANGE UP (ref 2–14)
NEUTROPHILS # BLD AUTO: 3.77 K/UL — SIGNIFICANT CHANGE UP (ref 1.8–7.4)
NEUTROPHILS NFR BLD AUTO: 52.2 % — SIGNIFICANT CHANGE UP (ref 43–77)
NITRITE UR-MCNC: NEGATIVE — SIGNIFICANT CHANGE UP
OPIATES UR-MCNC: POSITIVE
PCP SPEC-MCNC: SIGNIFICANT CHANGE UP
PCP UR-MCNC: NEGATIVE — SIGNIFICANT CHANGE UP
PH UR: 5 — SIGNIFICANT CHANGE UP (ref 5–8)
PLATELET # BLD AUTO: 384 K/UL — SIGNIFICANT CHANGE UP (ref 150–400)
POTASSIUM SERPL-MCNC: 3.7 MMOL/L — SIGNIFICANT CHANGE UP (ref 3.5–5.3)
POTASSIUM SERPL-SCNC: 3.7 MMOL/L — SIGNIFICANT CHANGE UP (ref 3.5–5.3)
PROT SERPL-MCNC: 6.7 G/DL — SIGNIFICANT CHANGE UP (ref 6.6–8.7)
PROT UR-MCNC: NEGATIVE MG/DL — SIGNIFICANT CHANGE UP
RBC # BLD: 4.4 M/UL — SIGNIFICANT CHANGE UP (ref 4.2–5.8)
RBC # FLD: 13.1 % — SIGNIFICANT CHANGE UP (ref 10.3–14.5)
SODIUM SERPL-SCNC: 145 MMOL/L — SIGNIFICANT CHANGE UP (ref 135–145)
SP GR SPEC: 1.02 — SIGNIFICANT CHANGE UP (ref 1.01–1.02)
THC UR QL: POSITIVE
UROBILINOGEN FLD QL: 1 MG/DL
WBC # BLD: 7.22 K/UL — SIGNIFICANT CHANGE UP (ref 3.8–10.5)
WBC # FLD AUTO: 7.22 K/UL — SIGNIFICANT CHANGE UP (ref 3.8–10.5)

## 2020-05-02 PROCEDURE — 80053 COMPREHEN METABOLIC PANEL: CPT

## 2020-05-02 PROCEDURE — 83690 ASSAY OF LIPASE: CPT

## 2020-05-02 PROCEDURE — 80307 DRUG TEST PRSMV CHEM ANLYZR: CPT

## 2020-05-02 PROCEDURE — 99284 EMERGENCY DEPT VISIT MOD MDM: CPT

## 2020-05-02 PROCEDURE — 99285 EMERGENCY DEPT VISIT HI MDM: CPT

## 2020-05-02 PROCEDURE — 81003 URINALYSIS AUTO W/O SCOPE: CPT

## 2020-05-02 PROCEDURE — 85027 COMPLETE CBC AUTOMATED: CPT

## 2020-05-02 PROCEDURE — 36415 COLL VENOUS BLD VENIPUNCTURE: CPT

## 2020-05-02 RX ORDER — SODIUM CHLORIDE 9 MG/ML
1000 INJECTION INTRAMUSCULAR; INTRAVENOUS; SUBCUTANEOUS ONCE
Refills: 0 | Status: COMPLETED | OUTPATIENT
Start: 2020-05-02 | End: 2020-05-02

## 2020-05-02 RX ADMIN — SODIUM CHLORIDE 1000 MILLILITER(S): 9 INJECTION INTRAMUSCULAR; INTRAVENOUS; SUBCUTANEOUS at 18:18

## 2020-05-02 NOTE — ED PROVIDER NOTE - OBJECTIVE STATEMENT
The patient sent in from [facility]ber house for clearance.  Apparently the patient had one shot of teiqula in the morning.  The patient denies any medical complaints  No HA, No CP, No SOB, No abd pain, No fever, No cough

## 2020-05-02 NOTE — ED ADULT NURSE NOTE - OBJECTIVE STATEMENT
Assumed pt care at this time. Pt resting comfortably in stretcher, A&Ox3, NAD noted, respirations even and nonlabored. Pt offers no complaints at this time, stating hes here for clearance for his sober house after drinking tequila today.

## 2020-05-02 NOTE — ED PROVIDER NOTE - CHPI ED SYMPTOMS NEG
no fever/no pain/no disorientation/no vomiting/no weakness/no abdominal distension/no abdominal pain/no chills/no confusion/no nausea

## 2020-05-02 NOTE — ED ADULT TRIAGE NOTE - CHIEF COMPLAINT QUOTE
Pt was sent by [facility]  for clearance due to drinking alcohol today , pt initially denied drinking alcohol but then states he had one shot of tequila, denies drugs use

## 2020-05-02 NOTE — ED PROVIDER NOTE - PATIENT PORTAL LINK FT
You can access the FollowMyHealth Patient Portal offered by [facility] by registering at the following website: [email or web address]. By joining [employer]’s FollowMyHealth portal, you will also be able to view your health information using other applications (apps) compatible with our system.

## 2020-05-03 ENCOUNTER — EMERGENCY (EMERGENCY)
Facility: HOSPITAL | Age: 40
LOS: 1 days | Discharge: HOME | End: 2020-05-03
Attending: EMERGENCY MEDICINE
Payer: MEDICAID

## 2020-05-03 VITALS
HEART RATE: 61 BPM | TEMPERATURE: 98 F | WEIGHT: 160.06 LBS | HEIGHT: 68 IN | DIASTOLIC BLOOD PRESSURE: 75 MMHG | SYSTOLIC BLOOD PRESSURE: 114 MMHG | OXYGEN SATURATION: 100 % | RESPIRATION RATE: 18 BRPM

## 2020-05-03 DIAGNOSIS — Z98.890 OTHER SPECIFIED POSTPROCEDURAL STATES: Chronic | ICD-10-CM

## 2020-05-03 PROCEDURE — 99285 EMERGENCY DEPT VISIT HI MDM: CPT

## 2020-05-03 PROCEDURE — 99284 EMERGENCY DEPT VISIT MOD MDM: CPT

## 2020-05-03 NOTE — ED PROVIDER NOTE - CLINICAL SUMMARY MEDICAL DECISION MAKING FREE TEXT BOX
Alcohol abuse requesting to be sent to sober house recently kicked out from sober house.  Patient oriented  3 GCS 15 in no distress.   unable to get emergency housing.  Patient discharged.

## 2020-05-03 NOTE — ED ADULT NURSE NOTE - CHIEF COMPLAINT QUOTE
Pt BIBA c/o alcohol intox, admits to drinking today. ALOOB. D/c'd from [facility] yesterday states he couldn't get into a sober house until tomorrow so he drank.

## 2020-05-03 NOTE — ED PROVIDER NOTE - CONSTITUTIONAL, MLM
normal... Well appearing, awake, alert, oriented to person, place, time/situation and in no apparent distress; watching television.

## 2020-05-03 NOTE — ED ADULT TRIAGE NOTE - CHIEF COMPLAINT QUOTE
Pt BIBA c/o alcohol intox, admits to drinking today. ALOOB. D/c'd from [facility] yesterday states he couldn't get into a sober house until tomorrow so he drank.

## 2020-05-03 NOTE — ED PROVIDER NOTE - CROS ED NEURO ALL NEG
You can access the [facility] Patient Portal, offered by [facility], by registering with the following website: [email or web address]
negative...

## 2020-05-03 NOTE — CHART NOTE - NSCHARTNOTEFT_GEN_A_CORE
SW Note: CANDIDO alerted by MD that pt is medically stable for d/c, an reported he was kicked out of sober house. SW met with pt at bedside. Pt reports he was only at sober house for 1 day, and was kicked out on Friday for drinking. Per pt, he was told to go to [facility] on Monday for new placement. SW assisted pt in calling [facility] Emergency housing after hours. Per Linda, Pt is sanctioned at this time and must call or report in person on Monday during regular business hours. Pt made aware, pt in understanding and is agreeance to d/c tonight. SW encouraged pt to reach out to family or friends for assistance. No further SW services identified at this time

## 2020-05-03 NOTE — ED PROVIDER NOTE - PATIENT PORTAL LINK FT
You can access the FollowMyHealth Patient Portal offered by [facility] by registering at the following website: [email or web address]. By joining [employer]’s FollowMyHealth portal, you will also be able to view your health information using other applications (apps) compatible with our system.
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
## 2020-05-07 DIAGNOSIS — Z71.89 OTHER SPECIFIED COUNSELING: ICD-10-CM

## 2020-05-08 DIAGNOSIS — F10.129 ALCOHOL ABUSE WITH INTOXICATION, UNSPECIFIED: ICD-10-CM

## 2020-05-08 DIAGNOSIS — Z87.891 PERSONAL HISTORY OF NICOTINE DEPENDENCE: ICD-10-CM

## 2020-09-23 NOTE — ED ADULT NURSE NOTE - MUSCULOSKELETAL ASSESSMENT
WDL I have personally performed a face to face diagnostic evaluation on this patient. I have reviewed the ACP note and agree with the history, exam and plan of care, except as noted.

## 2020-10-13 NOTE — ED ADULT NURSE NOTE - ALCOHOL PRE SCREEN (AUDIT - C)
See below- Yolie's note  [facility] appears to cover Incruse. Dr. Ronda Cruz wishes the pt. To use the Spiriva Respimat sample and then call and let her know if, the MDI is effective and then she will  place an order for Incruse to the mail order pharmacy. The pt. Is notified of same. Will wait to see if patient tolerates Spiriva- if helps then will do long term script for Incruse.
Spiriva resp not covered.  Incruse preferrred
Statement Selected

## 2020-11-11 NOTE — ED STATDOCS - MUSCULOSKELETAL [+], MLM
Is abo incompat.Interested in PEP.Discussed [employer].Wants to be backup-she's the spouse and has 3 small kids.Will send info/forms.Sched labs at  clinic.   JOINT PAIN/BACK PAIN/NECK PAIN

## 2021-01-27 NOTE — ED PROVIDER NOTE - CPE EDP RESP NORM
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  That is why I have referred him to a neurosurgeon to see if he would need a stent placement or surgery.   normal...

## 2021-06-24 NOTE — ED ADULT TRIAGE NOTE - ACCOMPANIED BY
[facility] CLINICAL PHARMACY REVIEW: Post-Discharge Transitions of Care (SALVADOR)  Subjective/Objective:  Jeffery Holstein is a 68 y.o. male. Patient was discharged from [facility] on 6/19/21 with a diagnosis of CAD and stents. Patient outreach to review discharge medications and provide medication review and management. Spoke with patient. Patient can not afford entresto and brilinta. Is still taking lisinopril since could not afford to  entresto and brilinta at the same. Also, notes in chart state increase rosuvastatin to reach ldl < 70, however patient states med list was wrong and he has already been taking rosuvastatin 20 mg once daily- therefore, rosuvastatin dose should be 40 mg daily. Also aspirin is BID and patient started brilinta- confirm if aspirin should be once a day. And patient is actually taking lisinopril at home- luckily has not picked up entresto yet due to cost- discuss with cardio if they have samples or can help with any of these meds and see if going to cont lisinopril or do wash out and convert to entresto. Cardio is Dr Faye Milan [phone]    I did also mention medicare extra help program to patient and for him to sign up for that. Per cardio ASA 81 mg daily  Change crestor to 40 mg- cardio will in script  And they have samples of entresto and will help get him set up with assistance for brilinta and entresto. Needs to do wash out since went home and started taking lisinopril again. (even though he received in entresto in the hospital- so no wash out)  Dr Kristian Lama nurse states she will call patient and review these changes. Is taking BP- 139/69 but sometimes goes low. 87/52- sometimes he does hold the clonidine. Asked patient to discuss with his nephrologist and cardiologist.     Is checking sugar BID, A1c 6.8. has enough supplies     No Known Allergies    Discharge Medications (as per discharging medication list found in AVS):   There are NEW medications for (FLOMAX) 0.4 MG capsule TAKE 1 CAPSULE BY MOUTH ONCE DAILY AT BEDTIME    insulin lispro (HUMALOG) 100 UNIT/ML injection vial Inject 1 Units into the skin 3 times daily (before meals) Sliding Scale as needed   BS have been controlled and he has not needed this in a long time    nitroGLYCERIN (NITROSTAT) 0.4 MG SL tablet Place 1 tablet under the tongue every 5 minutes as needed for Chest pain up to max of 3 total doses. If no relief after 1 dose, call 911.  polycarbophil (FIBERCON) 625 MG tablet Take 625 mg by mouth 2 times daily  - now takes metamucil capsule    Garlic 701 MG CAPS Take by mouth 2 times daily    Cholecalciferol (VITAMIN D3) 50 MCG (2000 UT) CAPS Take by mouth daily  - 2000 units daily    Ascorbic Acid (VITAMIN C) 1000 MG tablet Take 1,000 mg by mouth daily    zinc sulfate (ZINCATE) 220 (50 Zn) MG capsule Take 220 mg by mouth daily  - 50 mg    TURMERIC PO Take 500 capsules by mouth daily    Magnesium Oxide 400 MG CAPS Take 2 capsules by mouth 2 times daily   - is taking 1000 mg BID. Discussed with patient this is a high dose- however his mag level was 2.0 and he states nephrologist put him on this dose    Omega-3 Fatty Acids (FISH OIL) 1000 MG CAPS Take 1,000 mg by mouth 2 CAPS IN THE AM 1 CAP IN PM   - now on generic lovaza    B Complex Vitamins (B COMPLEX 1 PO) Take by mouth daily    aspirin 81 MG tablet Take 81 mg by mouth 2 times daily  - should this be BID if now started brilinta    diclofenac sodium (VOLTAREN) 1 % GEL Apply 4 g topically 4 times daily  - is not taking     These are the medications you have told us you were taking at home, STOP taking them after you leave the hospital:   na    Additional Medications:   Clonidine 0.1 mg   Lisinopril 10 mg BID   Vitamin E- discussed conflicting info on Vit E and heart. He will also ask cardio if he should cont this   Ginger    Unforgettable- discussed with patient this supplement may not be a good idea with his cardiac meds.  He will discuss with his cardiologist   Biotin   Beet root   Generic lovaza    CrCl cannot be calculated (Unknown ideal weight. ). Assessment/Plan:  - Medication reconciliation completed. Patient has not filled new medications ordered after this hospital discharge and is not taking medications as directed by discharging provider. - Instructions per discharge list provided except per below documentation. Identified medication discrepancies/issues:   · Called cardio office:  · Should aspirin be daily instead of BID- per cardio make asa 81 mg daily  · Does cardio want lisinopril or entresto- patient having a hard time affording medications-  Cardio will help get patient entresto and make sure patient does wash out of lisinopril for 36 hrs  · Cardio thought they had increased the dose of rosuvastatin, however patient has always been taking 20 mg (but our med list stated 10 mg)-  Cardio will send in script for rosuvastatin 40 mg. Patient wanted sent to [facility] instead of his mail order  · Medication list updated as appropriate/noted    - Identified Potential Medication Interactions: The following clinically significant interactions were identified via [facility] [facility] Interaction Analysis as category D or higher: lisinopril and entresto- need 36 hr wash out and pt has continued to take lisinopril.  .   ASA and brilinta- patient is taking ASA BID- prob should be once a day- will ask cardio    - Renal Dosing: No renal adjustments necessary.    - Follow up appointment(s):  · Reminded of upcoming appointment(s)  · Encouraged to schedule follow up as advised at discharge  Future Appointments   Date Time Provider Renata Cordova   6/30/2021  2:30 PM VERENICE Perry CNP [facility]   8/12/2021 10:30 AM Gennette Peaks, APRN - CNP [facility]       Sae Oneal, PharmD, [address] Pharmacist  Direct: [phone]  Department: [phone], option 7  ======================================  For Pharmacy [id number] [place] [address] Road in place:  No   Recommendation Provided To: Provider: 3 via [employer] provider office   Intervention Detail: Discontinued Rx: 1, reason: Interaction, Dose Adjustment: 2, reason: Interaction, Therapy Optimization and SALVADOR: Level 1 Intervention Present?: Yes - 1 - patient started on entresto in hospital and went home and took lisinopril. also was taking higher dose of aspirin and brilinta was started. Also got 2 stents and wrong dose of rosuvastatin was ordered.    Gap Closed?: Yes    Total # of Interventions Recommended: 3   Total # of Interventions Accepted: 3   Intervention Accepted By: Provider: 3   Time Spent (min): 90 EMT/paramedic

## 2021-09-07 NOTE — ED PROVIDER NOTE - GASTROINTESTINAL NEGATIVE STATEMENT, MLM
Attending Attestation (For Attendings USE Only)... no abdominal pain, no bloating, no constipation, no diarrhea, no nausea and no vomiting.

## 2022-03-01 NOTE — ED PROVIDER NOTE - CONDITION AT DISCHARGE:
MD JO Sol Board Certified in Sleep Medicine  Certified in [address] [place] [address] Certified in Neurology Yadira Steffany Kat [address] [place] [address],  Chris Duggan 67  [phone]   [address] [place] [address], [address] [place] [address]                      [address] [facility]  [address] [address] [phone]    Subjective:     Patient ID: Camila Solano is a 68 y.o. male. Chief Complaint   Patient presents with    Follow-up    Sleep Apnea       HPI:        Camila Solano is a 68 y.o. male was seen today as annual follow for  moderate obstructive sleep apnea with an AHI - 28.5/hr with Low SaO2 - 88% and time below 90% of 1.0 min. Patient is using the PAP machine about 100% of the time, more than 4 hours a nightabout  97 %, in total average of 7:15 hours a night in last 90 days. Currently on PAP at 9 cm (), the AHI is only 1.4 events per hour at this pressure. Patient improved regarding daytime sleepiness and fatigue, wakes up refreshed in the morning. The Patient scored Total score: 2 on Eland Sleepiness Scale ( more than 10 is indicative of daytime sleepiness)   Patient has no problem with PAP pressure or mask. Uses nasal pillow mask.     DOT/CDL - N/A        Previous Report(s)Reviewed: historical medical records         Social History     Socioeconomic History    Marital status:      Spouse name: Not on file    Number of children: Not on file    Years of education: Not on file    Highest education level: Not on file   Occupational History    Occupation: Retired    Tobacco Use    Smoking status: Former Smoker     Packs/day: 0.50     Years: 5.00     Pack years: 2.50     Types: Cigarettes     Quit date: 1972     Years since quittin.1    Smokeless tobacco: Never Used   Vaping Use    Vaping Use: Never used Substance and Sexual Activity    Alcohol use: Yes     Alcohol/week: 0.0 standard drinks     Comment: occasionally    Drug use: No    Sexual activity: Not on file   Other Topics Concern    Not on file   Social History Narrative    Not on file     Social Determinants of Health     Financial Resource Strain: Low Risk     Difficulty of Paying Living Expenses: Not hard at all   Food Insecurity: No Food Insecurity    Worried About [address] in the Last Year: Never true    [address] in the Last Year: Never true   Transportation Needs:     Lack of Transportation (Medical): Not on file    Lack of Transportation (Non-Medical): Not on file   Physical Activity:     Days of Exercise per Week: Not on file    Minutes of Exercise per Session: Not on file   Stress:     Feeling of Stress : Not on file   Social Connections:     Frequency of Communication with Friends and Family: Not on file    Frequency of Social Gatherings with Friends and Family: Not on file    Attends Rastafari Services: Not on file    Active Member of [address] [place] [address] or Organizations: Not on file    Attends Club or Organization Meetings: Not on file    Marital Status: Not on file   Intimate Partner Violence:     Fear of Current or Ex-Partner: Not on file    Emotionally Abused: Not on file    Physically Abused: Not on file    Sexually Abused: Not on file   Housing Stability:     Unable to Pay for Housing in the Last Year: Not on file    Number of [place] in the Last Year: Not on file    Unstable Housing in the Last Year: Not on file       Prior to Admission medications    Medication Sig Start Date End Date Taking?  Authorizing Provider   levothyroxine (SYNTHROID) 100 MCG tablet One daily 2/28/22  Yes Oswald Becker DO   propranolol (INDERAL LA) 60 MG extended release capsule Take 1 capsule by mouth daily 1/14/22  Yes Oswald Becker DO   fluticasone (FLONASE) 50 MCG/ACT nasal spray USE 1 SPRAY IN EACH NOSTRIL EVERY DAY 21  Yes Oswald Becker DO   atorvastatin (LIPITOR) 80 MG tablet TAKE 1 TABLET EVERY DAY 21  Yes Oswald Becker DO   tadalafil (CIALIS) 5 MG tablet One or two daily prn 5/10/21  Yes Oswald Becker DO   tamsulosin (FLOMAX) 0.4 MG capsule TAKE 1 CAPSULE EVERY DAY 21  Yes Oswald Becker DO   citalopram (CELEXA) 20 MG tablet Take 1.5 tablets by mouth daily 21  Yes Oswald Becker DO   ibuprofen (ADVIL;MOTRIN) 800 MG tablet TAKE 1 TABLET BY MOUTH THREE TIMES DAILY AS NEEDED FOR PAIN 3/21/20  Yes Historical Provider, MD   fluticasone [facility]) 50 MCG/ACT nasal spray 2 sprays by Nasal route daily 8/12/15  Yes Thania Vieira MD       Allergies as of 2022    (No Known Allergies)       Patient Active Problem List   Diagnosis    Depression    Hypothyroidism    Hyperlipidemia    Benign non-nodular prostatic hyperplasia with lower urinary tract symptoms    Impaired fasting glucose    Allergic rhinitis    Vitamin D deficiency    Thyroid nodule    Sleep apnea    Major depressive disorder, recurrent, in full remission ([facility] [address]       Past Medical History:   Diagnosis Date    Allergic rhinitis, cause unspecified     Depressive disorder, not elsewhere classified     Hyperlipidemia LDL goal < 100     Hypertrophy of prostate with urinary obstruction and other lower urinary tract symptoms (LUTS)     Hypothyroidism     Impaired fasting glucose 2005    Other testicular hypofunction     Palpitations     Sleep apnea     Thyroid nodule     Unspecified hypothyroidism     Uropathy obstructive 2007    Vitamin D deficiency        Past Surgical History:   Procedure Laterality Date    ANKLE FRACTURE SURGERY      COLONOSCOPY  2006    HERNIA REPAIR      THYROID SURGERY      Nodule removed/ solid status post excised laura Das       Family History   Problem Relation Age of Onset    Heart Disease Mother     Heart Disease Father          age 58  mi   first mi age 48    High Blood Pressure Father     Cancer Sister         sarcoma     Dementia Brother     Coronary Art Dis Other     Colon Cancer Neg Hx        Review of Systems   Constitutional: Negative for fatigue. Respiratory: Negative for apnea. Cardiovascular: Negative for leg swelling. Genitourinary: Positive for frequency (1-2). Neurological: Negative for headaches. Objective:     Vitals:  Weight BMI Neck circumference    Wt Readings from Last 3 Encounters:   03/01/22 213 lb 12.8 oz (97 kg)   01/07/22 209 lb (94.8 kg)   09/07/21 204 lb 9.6 oz (92.8 kg)    Body mass index is 28.21 kg/m². BP HR SaO2   BP Readings from Last 3 Encounters:   03/01/22 115/60   01/07/22 122/70   09/07/21 126/64    Pulse Readings from Last 3 Encounters:   03/01/22 (!) 44   09/07/21 56   12/10/20 72    SpO2 Readings from Last 3 Encounters:   03/01/22 99%   09/07/21 97%   12/10/20 96%        Themandibular molar Class :   [x]1 []2 []3      Mallampati I Airway Classification:   []1 [x]2 []3 []4      Physical Exam  Vitals and nursing note reviewed. Cardiovascular:      Rate and Rhythm: Normal rate and regular rhythm. Pulmonary:      Effort: Pulmonary effort is normal.      Breath sounds: Normal breath sounds. Musculoskeletal:         General: No swelling. Skin:     General: Skin is warm. Neurological:      Mental Status: Mental status is at baseline. Psychiatric:         Mood and Affect: Mood normal.         :   Moderate Obstructive Sleep Apnea/Hypopnea Syndrome under good control on PAP at 9 cmwp, his machine is old and has recall     Diagnosis Orders   1. CAROL on CPAP     2. Dependence on other enabling machines and devices       Plan:     New CPAP ResMed at 9 cm with nasal pillow mask. I will order PAP supplies, mask, filters. ... No orders of the defined types were placed in this encounter. Return for F/U between 31 and 90 days from the recieving CPAP.     Leticia Soares MD  Medical Director [address] [place] [address] Improved

## 2022-04-16 NOTE — ED ADULT NURSE NOTE - TOBACCO SCREENING (FROM THE ASSIST)
Right AROM shoulder/elbow/wrist/hand WFL; LUE AROM shoulder/elbow/wrist/hand WFL/no Active ROM deficits were identified Statement Selected

## 2022-06-28 NOTE — ED ADULT NURSE REASSESSMENT NOTE - NS ED NURSE REASSESS COMMENT FT1
Report taken at 0700 from Yumiko Felipe RN. Pt asleep easy to arouse. Pt alert to person place and time, brother at bedside. When asked why he is here pt does not know, brother states he is here for housing. VSS. Denies pain. call bell in reach will continue to monitor awaiting social work
pt eating breakfast, social work spoke with pt, pt for discharge, instructions reviewed with pt and brother who indicated understanding
pt resting comfortably in cart. breathing is even and unlabored. pt awaiting social work for placement, will continue to monitor and reassess
pt resting comfortably in chair.  breathing si even and unlabored. pt awaiting placement by social work. will continue to monitor and reassess
pt sitting up reading. pt awaiting for SW for dispo.
When Should The Patient Follow-Up For Their Next Full-Body Skin Exam?: 1 Year
Quality 137: Melanoma: Continuity Of Care - Recall System: Patient information entered into a recall system that includes: target date for the next exam specified AND a process to follow up with patients regarding missed or unscheduled appointments
Detail Level: Detailed
Detail Level: Generalized

## 2022-07-01 NOTE — ED ADULT TRIAGE NOTE - WEIGHT IN LBS
[facility]  Initial Consult Note  Date: 7/1/2022    Hospital day :1     Impression:   · Non healing open wound on left lower extremity with purulent and malodorous discharge. · Peripheral arterial disease. · Type 2 Diabetes with neuropathy. Recommendations   · Switch to Doxycycline orally as wound cultures revealed gram positive cocci. · Follow up on vascular intervention and manage accordingly. Chief complaint/reason for consultation:   The patient was admitted to the hospital for non-healing left lower extremity wound on the second digit after amputation in March 2022 and was consulted to ID department for the same. There is persistent redness with purulent and malodorous discharge even after multiple debridements. History of Present Illness:   Darlyn Jackson is a 79y.o.-year-old male who was initially admitted on 6/30/2022 for non healing left lower extremity wound on the second digit after amputation in March 2022. I have personally reviewed the past medical history, past surgical history, medications, social history, and family history, and I have updated the database accordingly.   Past Medical History:     Past Medical History:   Diagnosis Date    Atrial fibrillation ([facility]     CAD (coronary artery disease)     CHF (congestive heart failure) (HCC)     Diabetes mellitus ([facility] [facility]     Hyperlipidemia     Hypertension      Past Surgical  History:     Past Surgical History:   Procedure Laterality Date    CARDIAC SURGERY  2010    CABG X3    COLONOSCOPY      DEBRIDEMENT Right 11/19/2015    DEBRIDEMENT WOUND FOOT RIGHT W/ APPLICATION BILAT INTEG RAT GRAFT    ENDOSCOPY, COLON, DIAGNOSTIC      EYE SURGERY Bilateral     cataracts    HERNIA REPAIR      umbilical    KNEE ARTHROSCOPY Right     OTHER SURGICAL HISTORY Right 12 29 15    wound care graft procedure foot,appl of integra    PACEMAKER PLACEMENT  2011    WITH DEFIBRILLATOR    TOE AMPUTATION Right     R great    TOE AMPUTATION Left 3/13/2022    TOE AMPUTATION--left 2nd digit performed by Yelitza Stewart DPM at [facility] OR     Medications:      furosemide  80 mg Oral Daily    magnesium sulfate  1,000 mg IntraVENous Once    cefepime  2,000 mg IntraVENous Q12H    sodium hypochlorite   Irrigation Daily    vancomycin (VANCOCIN) intermittent dosing (placeholder)   Other RX Placeholder    vancomycin  1,250 mg IntraVENous Q24H    sodium chloride flush  5-40 mL IntraVENous 2 times per day    aspirin  81 mg Oral Daily    atorvastatin  40 mg Oral Daily    gabapentin  100 mg Oral TID    carvedilol  6.25 mg Oral BID WC    warfarin placeholder: dosing by pharmacy   Other RX Placeholder    insulin lispro  0-6 Units SubCUTAneous TID WC    insulin lispro  0-3 Units SubCUTAneous Nightly     Social History:     Social History     Socioeconomic History    Marital status: Single     Spouse name: Not on file    Number of children: Not on file    Years of education: Not on file    Highest education level: Not on file   Occupational History    Not on file   Tobacco Use    Smoking status: Never Smoker    Smokeless tobacco: Never Used   Substance and Sexual Activity    Alcohol use: Yes     Comment: SOCIAL on weekends    Drug use: No    Sexual activity: Not on file   Other Topics Concern    Not on file   Social History Narrative    Not on file     Social Determinants of Health     Financial Resource Strain:     Difficulty of Paying Living Expenses: Not on file   Food Insecurity:     Worried About Running Out of Food in the Last Year: Not on file    Tee of Food in the Last Year: Not on file   Transportation Needs:     Lack of Transportation (Medical): Not on file    Lack of Transportation (Non-Medical):  Not on file   Physical Activity:     Days of Exercise per Week: Not on file    Minutes of Exercise per Session: Not on file   Stress:     Feeling of Stress : Not on file   Social Connections:     Frequency of Communication with Friends and Family: Not on file    Frequency of Social Gatherings with Friends and Family: Not on file    Attends Buddhist Services: Not on file    Active Member of Clubs or Organizations: Not on file    Attends Club or Organization Meetings: Not on file    Marital Status: Not on file   Intimate Partner Violence:     Fear of Current or Ex-Partner: Not on file    Emotionally Abused: Not on file    Physically Abused: Not on file    Sexually Abused: Not on file   Housing Stability:     Unable to Pay for Housing in the Last Year: Not on file    Number of [place] in the Last Year: Not on file    Unstable Housing in the Last Year: Not on file     Family History:     Family History   Problem Relation Age of Onset    Cancer Father         pancreatic cancer    Other Brother         MI    Osteoarthritis Mother     Other Maternal Grandfather         MI      Allergies:   Doxycycline, Pcn [penicillins], and Penicillin g     Review of Systems:   General: No fevers or chills. Eyes: No double vision or blurry vision. ENT: No sore throat or runny nose. Cardiovascular: Swelling of left lower extremity and No chest pain or palpitations. Lung: No shortness of breath or cough. Abdomen: No nausea, vomiting, diarrhea, or abdominal pain. Genitourinary: No increased urinary frequency, or dysuria. Musculoskeletal: No muscle aches or pains. Hematologic: Normal  Neurologic: Numbness and loss of pain sensation in lower extremities and No headache, weakness, numbness, or tingling.     Physical Examination :   /68   Pulse 87   Temp 99.1 °F (37.3 °C) (Temporal)   Resp 14   Ht 5' 10\" (1.778 m)   Wt 187 lb (84.8 kg)   SpO2 99%   BMI 26.83 kg/m²     Temperature Range: Temp: 99.1 °F (37.3 °C) Temp  Av °F (36.7 °C)  Min: 97.3 °F (36.3 °C)  Max: 99.1 °F (37.3 °C)  General Appearance: Awake, alert, and in no apparent distress  Head: Normocephalic, without obvious abnormality, atraumatic  Eyes: Pupils equal, round, reactive, to light and accommodation; extraocular movements intact; sclera anicteric; conjunctivae pink  ENT: Oropharynx clear, without erythema, exudate, or thrush. Neck: Supple, without lymphadenopathy. Pulmonary/Chest: Clear to auscultation, without wheezes, rales, or rhonchi  Cardiovascular: Systolic murmur present. Abdomen: soft, non-tender, non-distended, normal bowel sounds, no masses or organomegaly and Soft, nontender, nondistended. Extremities: foot exam abnormal- amputation of 1st and 2nd toe on left lower extremity, non healing open wound with discharge. Swelling, diabetic neuropathy, and No cyanosis, clubbing, edema, or effusions. Neurologic: reflexes normal and symmetric, no cranial nerve deficit, gait, coordination and speech normal and No gross sensory or motor deficits. Skin: No bruising, erythema, rash. Medical Decision Making:   I have independently reviewed/ordered the following labs:  CBC with Differential:   Recent Labs     06/30/22  1219 07/01/22  0550   WBC 8.5 6.0   HGB 9.3* 8.8*   HCT 29.3* 28.0*    240   LYMPHOPCT 9* 12*   MONOPCT 8 8     BMP:   Recent Labs     06/30/22  1319 07/01/22  0550    136   K 3.9 3.9   CL 99 101   CO2 25 24   BUN 29* 26*   CREATININE 1.66* 1.60*   MG 1.9  --      Hepatic Function Panel: No results for input(s): PROT, LABALBU, BILIDIR, IBILI, BILITOT, ALKPHOS, ALT, AST in the last 72 hours. No results found for: PROCAL    Lab Results   Component Value Date/Time    CRP 36.7 06/30/2022 01:19 PM    CRP <3.0 04/07/2022 10:08 AM    CRP 24.6 03/14/2022 06:44 AM     No results found for: FERRITIN  No results found for: FIBRINOGEN  No results found for: DDIMER  No results found for: LDH    Lab Results   Component Value Date    SEDRATE 97 (H) 06/30/2022       Lab Results   Component Value Date/Time    COVID19 Not Detected 03/12/2022 10:05 PM     No results found for requested labs within last 30 days. Imaging Studies:   XR FOOT LEFT (MIN 3 VIEWS)    Result Date: 6/30/2022  EXAMINATION: THREE XRAY VIEWS OF THE LEFT FOOT 6/30/2022 7:37 pm COMPARISON: 03/07/2022. HISTORY: ORDERING SYSTEM PROVIDED HISTORY: rule out osteomyelitis left forefoot TECHNOLOGIST PROVIDED HISTORY: rule out osteomyelitis left forefoot FINDINGS: Prior 1st and 2nd digit TMT amputation. There is edema and emphysema in the overlying soft tissues with osseous erosion and destruction of the 2nd metatarsal head. There is chronic appearing deformity in the 3rd metatarsal head. No dislocation. Age-indeterminate fracture at the base of the 3rd digit proximal phalanx. Degenerative changes are noted in the left foot. 1. Soft tissue edema and emphysema. 2. Osseous erosion/destruction in the 2nd metatarsal head, concerning for osteomyelitis. 3. Age-indeterminate fracture of the base of the 3rd digit proximal phalanx. Cultures:     Culture, Blood 1 [[id number]] Collected: 06/30/22 1300   Order Status: Completed Specimen: Blood Updated: 07/01/22 1176    Specimen Description . BLOOD    Special Requests L AC 20ML    Culture NO GROWTH 16 HOURS   Culture, Blood 1 [[id number]] Collected: 06/30/22 1315   Order Status: Completed Specimen: Blood Updated: 07/01/22 5894    Specimen Description . BLOOD    Special Requests L HAND 20MLIDE    Culture NO GROWTH 16 HOURS           Thank you for allowing us to participate in the care of this patient. Please call with questions. Electronically signed by Erendira Rios MD on 7/1/2022 at 11:05 AM      [facility]  Erendira Rios MD  [employer] messaging  OFFICE: [phone]      This note is created with the assistance of a speech recognition program.  While intending to generate a document that actually reflects the content of the visit, the document can still have some errors including those of syntax and sound a like substitutions which may escape proof reading.   In such instances, actual meaning can be extrapolated by contextual diversion. 160

## 2023-10-15 ENCOUNTER — EMERGENCY (EMERGENCY)
Facility: HOSPITAL | Age: 43
LOS: 0 days | Discharge: HOME | End: 2023-10-15
Attending: FAMILY MEDICINE
Payer: MEDICAID

## 2023-10-15 VITALS
OXYGEN SATURATION: 98 % | SYSTOLIC BLOOD PRESSURE: 111 MMHG | TEMPERATURE: 98 F | HEIGHT: 67 IN | HEART RATE: 76 BPM | DIASTOLIC BLOOD PRESSURE: 86 MMHG | WEIGHT: 160.06 LBS | RESPIRATION RATE: 18 BRPM

## 2023-10-15 VITALS
HEART RATE: 90 BPM | DIASTOLIC BLOOD PRESSURE: 72 MMHG | SYSTOLIC BLOOD PRESSURE: 134 MMHG | TEMPERATURE: 99 F | RESPIRATION RATE: 18 BRPM | OXYGEN SATURATION: 98 %

## 2023-10-15 DIAGNOSIS — Z87.820 PERSONAL HISTORY OF TRAUMATIC BRAIN INJURY: ICD-10-CM

## 2023-10-15 DIAGNOSIS — F10.129 ALCOHOL ABUSE WITH INTOXICATION, UNSPECIFIED: ICD-10-CM
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
## 2023-10-15 DIAGNOSIS — Z90.81 ACQUIRED ABSENCE OF SPLEEN: ICD-10-CM

## 2023-10-15 DIAGNOSIS — Z98.890 OTHER SPECIFIED POSTPROCEDURAL STATES: Chronic | ICD-10-CM

## 2023-10-15 DIAGNOSIS — Z87.39 PERSONAL HISTORY OF OTHER DISEASES OF THE MUSCULOSKELETAL SYSTEM AND CONNECTIVE TISSUE: ICD-10-CM

## 2023-10-15 LAB
ALBUMIN SERPL ELPH-MCNC: 4.4 G/DL — SIGNIFICANT CHANGE UP (ref 3.3–5)
ALP SERPL-CCNC: 78 U/L — SIGNIFICANT CHANGE UP (ref 40–120)
ALT FLD-CCNC: 21 U/L — SIGNIFICANT CHANGE UP (ref 12–78)
AMPHET UR-MCNC: NEGATIVE — SIGNIFICANT CHANGE UP
ANION GAP SERPL CALC-SCNC: 4 MMOL/L — LOW (ref 5–17)
APAP SERPL-MCNC: <2 UG/ML — LOW (ref 10–30)
APPEARANCE UR: CLEAR — SIGNIFICANT CHANGE UP
AST SERPL-CCNC: 16 U/L — SIGNIFICANT CHANGE UP (ref 15–37)
BARBITURATES UR SCN-MCNC: NEGATIVE — SIGNIFICANT CHANGE UP
BASOPHILS # BLD AUTO: 0.13 K/UL — SIGNIFICANT CHANGE UP (ref 0–0.2)
BASOPHILS NFR BLD AUTO: 1.6 % — SIGNIFICANT CHANGE UP (ref 0–2)
BENZODIAZ UR-MCNC: NEGATIVE — SIGNIFICANT CHANGE UP
BILIRUB SERPL-MCNC: 0.7 MG/DL — SIGNIFICANT CHANGE UP (ref 0.2–1.2)
BILIRUB UR-MCNC: NEGATIVE — SIGNIFICANT CHANGE UP
BUN SERPL-MCNC: 12 MG/DL — SIGNIFICANT CHANGE UP (ref 7–23)
CALCIUM SERPL-MCNC: 9.2 MG/DL — SIGNIFICANT CHANGE UP (ref 8.5–10.1)
CHLORIDE SERPL-SCNC: 109 MMOL/L — HIGH (ref 96–108)
CO2 SERPL-SCNC: 30 MMOL/L — SIGNIFICANT CHANGE UP (ref 22–31)
COCAINE METAB.OTHER UR-MCNC: NEGATIVE — SIGNIFICANT CHANGE UP
COLOR SPEC: YELLOW — SIGNIFICANT CHANGE UP
CREAT SERPL-MCNC: 0.8 MG/DL — SIGNIFICANT CHANGE UP (ref 0.5–1.3)
DIFF PNL FLD: NEGATIVE — SIGNIFICANT CHANGE UP
EGFR: 113 ML/MIN/1.73M2 — SIGNIFICANT CHANGE UP
EOSINOPHIL # BLD AUTO: 0.05 K/UL — SIGNIFICANT CHANGE UP (ref 0–0.5)
EOSINOPHIL NFR BLD AUTO: 0.6 % — SIGNIFICANT CHANGE UP (ref 0–6)
ETHANOL SERPL-MCNC: 229 MG/DL — HIGH (ref 0–10)
GLUCOSE SERPL-MCNC: 102 MG/DL — HIGH (ref 70–99)
GLUCOSE UR QL: NEGATIVE MG/DL — SIGNIFICANT CHANGE UP
HCT VFR BLD CALC: 49.1 % — SIGNIFICANT CHANGE UP (ref 39–50)
HGB BLD-MCNC: 17.2 G/DL — HIGH (ref 13–17)
IMM GRANULOCYTES NFR BLD AUTO: 0.5 % — SIGNIFICANT CHANGE UP (ref 0–0.9)
KETONES UR-MCNC: NEGATIVE MG/DL — SIGNIFICANT CHANGE UP
LEUKOCYTE ESTERASE UR-ACNC: NEGATIVE — SIGNIFICANT CHANGE UP
LYMPHOCYTES # BLD AUTO: 1.75 K/UL — SIGNIFICANT CHANGE UP (ref 1–3.3)
LYMPHOCYTES # BLD AUTO: 21.1 % — SIGNIFICANT CHANGE UP (ref 13–44)
MCHC RBC-ENTMCNC: 33.9 PG — SIGNIFICANT CHANGE UP (ref 27–34)
MCHC RBC-ENTMCNC: 35 GM/DL — SIGNIFICANT CHANGE UP (ref 32–36)
MCV RBC AUTO: 96.7 FL — SIGNIFICANT CHANGE UP (ref 80–100)
METHADONE UR-MCNC: NEGATIVE — SIGNIFICANT CHANGE UP
MONOCYTES # BLD AUTO: 0.64 K/UL — SIGNIFICANT CHANGE UP (ref 0–0.9)
MONOCYTES NFR BLD AUTO: 7.7 % — SIGNIFICANT CHANGE UP (ref 2–14)
NEUTROPHILS # BLD AUTO: 5.7 K/UL — SIGNIFICANT CHANGE UP (ref 1.8–7.4)
NEUTROPHILS NFR BLD AUTO: 68.5 % — SIGNIFICANT CHANGE UP (ref 43–77)
NITRITE UR-MCNC: NEGATIVE — SIGNIFICANT CHANGE UP
OPIATES UR-MCNC: NEGATIVE — SIGNIFICANT CHANGE UP
PCP SPEC-MCNC: SIGNIFICANT CHANGE UP
PCP UR-MCNC: NEGATIVE — SIGNIFICANT CHANGE UP
PH UR: 5.5 — SIGNIFICANT CHANGE UP (ref 5–8)
PLATELET # BLD AUTO: 426 K/UL — HIGH (ref 150–400)
POTASSIUM SERPL-MCNC: 4.9 MMOL/L — SIGNIFICANT CHANGE UP (ref 3.5–5.3)
POTASSIUM SERPL-SCNC: 4.9 MMOL/L — SIGNIFICANT CHANGE UP (ref 3.5–5.3)
PROT SERPL-MCNC: 7.9 GM/DL — SIGNIFICANT CHANGE UP (ref 6–8.3)
PROT UR-MCNC: NEGATIVE MG/DL — SIGNIFICANT CHANGE UP
RBC # BLD: 5.08 M/UL — SIGNIFICANT CHANGE UP (ref 4.2–5.8)
RBC # FLD: 12.5 % — SIGNIFICANT CHANGE UP (ref 10.3–14.5)
SALICYLATES SERPL-MCNC: <1.7 MG/DL — LOW (ref 2.8–20)
SODIUM SERPL-SCNC: 143 MMOL/L — SIGNIFICANT CHANGE UP (ref 135–145)
SP GR SPEC: 1.01 — SIGNIFICANT CHANGE UP (ref 1–1.03)
THC UR QL: NEGATIVE — SIGNIFICANT CHANGE UP
UROBILINOGEN FLD QL: 0.2 MG/DL — SIGNIFICANT CHANGE UP (ref 0.2–1)
WBC # BLD: 8.31 K/UL — SIGNIFICANT CHANGE UP (ref 3.8–10.5)
WBC # FLD AUTO: 8.31 K/UL — SIGNIFICANT CHANGE UP (ref 3.8–10.5)

## 2023-10-15 PROCEDURE — 85025 COMPLETE CBC W/AUTO DIFF WBC: CPT

## 2023-10-15 PROCEDURE — 81003 URINALYSIS AUTO W/O SCOPE: CPT

## 2023-10-15 PROCEDURE — 99285 EMERGENCY DEPT VISIT HI MDM: CPT | Mod: 25

## 2023-10-15 PROCEDURE — 93005 ELECTROCARDIOGRAM TRACING: CPT

## 2023-10-15 PROCEDURE — 99284 EMERGENCY DEPT VISIT MOD MDM: CPT

## 2023-10-15 PROCEDURE — 93010 ELECTROCARDIOGRAM REPORT: CPT

## 2023-10-15 PROCEDURE — 36415 COLL VENOUS BLD VENIPUNCTURE: CPT

## 2023-10-15 PROCEDURE — 80307 DRUG TEST PRSMV CHEM ANLYZR: CPT

## 2023-10-15 PROCEDURE — 80053 COMPREHEN METABOLIC PANEL: CPT

## 2023-10-15 NOTE — ED PROVIDER NOTE - NSICDXPASTMEDICALHX_GEN_ALL_CORE_FT
PAST MEDICAL HISTORY:  IV drug user     TBI (traumatic brain injury)     TBI (traumatic brain injury)

## 2023-10-15 NOTE — ED PROVIDER NOTE - CLINICAL SUMMARY MEDICAL DECISION MAKING FREE TEXT BOX
Pt with hx TBI brought in by police who thought he was intoxicated. pt states no intoxicants. Pt has no trauma. Labs done to r/o intoxication. Prob d/c.

## 2023-10-15 NOTE — ED ADULT TRIAGE NOTE - CHIEF COMPLAINT QUOTE
Pt. A&OX3, BIBEMS with possible intoxication. EMS reports [facility] called because the patient appeared to be intoxicated, was unable to ambulate. Pt has a history of TBI. Pt reports they sent him here because he showed signs of being intoxicated. "I do not drink." Diagnosed in 2016 with a TBI.   Denies CP or SOB.

## 2023-10-15 NOTE — ED PROVIDER NOTE - ATTENDING APP SHARED VISIT CONTRIBUTION OF CARE
Pt eval, treatment and plan contemporaneously with NANCI Moctezuma. Agree with notes. Silvia RODRIGUEZ

## 2023-10-15 NOTE — ED ADULT NURSE NOTE - NSFALLRISKINTERV_ED_ALL_ED

## 2023-10-15 NOTE — ED PROVIDER NOTE - OBJECTIVE STATEMENT
pt is a 44 yo wm with hx tbi who was sitting in the sun todaya and was picked up by Police because they thought he was intoxicated. pt states no trauma or use of intoxicants--"I talk this way cause I have TBI". No recent illness. Pt has a home in [place] and does not need to see Social Work. pt is a 44 yo wm with hx tbi who was sitting in the sun today and was picked up by Police because they thought he was intoxicated. pt states no trauma or use of intoxicants--"I talk this way cause I have TBI". No recent illness. Pt has a home in [place] and does not need to see Social Work.

## 2023-10-15 NOTE — ED PROVIDER NOTE - PROGRESS NOTE DETAILS
42 y/o male with PMH TBI brought into ED by police as they thought he was intoxicated. Pt states he was "laying down getting some sun." Denies ETOH or drug use. Denies trauma or injury. Pt has a home he lives in, does not need to see social work at this time. On exam pt non toxic appearing, poor hygiene, no acute distress, AAOx3, no signs of trauma or injury, PE unremarkable. Labs were ordered to r/o intox, will f/u, anticipate dc home. - Edilia Moctezuma PA-C Labs reviewed. Blood alcohol level 229, rest of labs unremarkable. As patient is ambulating, no signs of trauma, will monitor and allow pt to metabolize until freedom to be discharged. - Edliia Moctezuma PA-C

## 2023-10-15 NOTE — ED PROVIDER NOTE - PATIENT PORTAL LINK FT
You can access the FollowMyHealth Patient Portal offered by [facility] by registering at the following website: [email or web address]. By joining [employer]’s FollowMyHealth portal, you will also be able to view your health information using other applications (apps) compatible with our system.

## 2023-10-15 NOTE — ED ADULT NURSE NOTE - OBJECTIVE STATEMENT
Pt presents to ER s/p being picked up on the side of the road by EMS after appearing intoxicated. Pt reports he does not drink or do drugs and is homeless. Pt reports he has housing. Hx of TBI. Denies any complaints. AO   x 3 oriented to baseline, normal breathing pattern with no difficulty

## 2023-10-15 NOTE — ED PROVIDER NOTE - CPE EDP HEME LYMPH NORM
normal... Information: This plan will allow you to select a body location and will not render the procedure on the note output. It will note the location you select in the morphology. Detail Level: Simple

## 2023-11-23 ENCOUNTER — EMERGENCY (EMERGENCY)
Facility: HOSPITAL | Age: 43
LOS: 0 days | Discharge: HOME | End: 2023-11-24
Attending: HOSPITALIST
Payer: MEDICAID

## 2023-11-23 VITALS
TEMPERATURE: 98 F | HEIGHT: 67 IN | OXYGEN SATURATION: 100 % | RESPIRATION RATE: 16 BRPM | WEIGHT: 160.06 LBS | HEART RATE: 94 BPM | DIASTOLIC BLOOD PRESSURE: 90 MMHG | SYSTOLIC BLOOD PRESSURE: 133 MMHG

## 2023-11-23 DIAGNOSIS — Z00.00 ENCOUNTER FOR GENERAL ADULT MEDICAL EXAMINATION WITHOUT ABNORMAL FINDINGS: ICD-10-CM

## 2023-11-23 DIAGNOSIS — Z98.890 OTHER SPECIFIED POSTPROCEDURAL STATES: Chronic | ICD-10-CM

## 2023-11-23 DIAGNOSIS — F10.129 ALCOHOL ABUSE WITH INTOXICATION, UNSPECIFIED: ICD-10-CM

## 2023-11-23 PROCEDURE — 99285 EMERGENCY DEPT VISIT HI MDM: CPT

## 2023-11-23 PROCEDURE — 99283 EMERGENCY DEPT VISIT LOW MDM: CPT

## 2023-11-23 NOTE — ED ADULT NURSE NOTE - OBJECTIVE STATEMENT
Patient BIBEMS for alcohol intoxication. Patient is alert and oriented x4. Patient denies any pain. Patient states that he would like a sandwich.

## 2023-11-24 VITALS
DIASTOLIC BLOOD PRESSURE: 82 MMHG | RESPIRATION RATE: 18 BRPM | OXYGEN SATURATION: 99 % | TEMPERATURE: 98 F | HEART RATE: 82 BPM | SYSTOLIC BLOOD PRESSURE: 129 MMHG

## 2023-11-24 NOTE — ED PROVIDER NOTE - OBJECTIVE STATEMENT
43-year-old male brought in by police after being found laying in the street intoxicated.  Patient admits to drinking last night.  Currently living at [facility] shelter.  Offers no complaints.

## 2023-11-24 NOTE — ED PROVIDER NOTE - PATIENT PORTAL LINK FT
You can access the FollowMyHealth Patient Portal offered by [facility] by registering at the following website: [email or web address]. By joining [employer]’s FollowMyHealth portal, you will also be able to view your health information using other applications (apps) compatible with our system.

## 2023-11-24 NOTE — ED PROVIDER NOTE - CLINICAL SUMMARY MEDICAL DECISION MAKING FREE TEXT BOX
clinically sober, will dc 43M-year-old male brought in for public intoxication.  Will monitor until clinically sober.   Patient up and ambulatory independently in the ED.  Eating and drinking and appropriate. He is clinically sober, will dc back to [facility].

## 2023-11-24 NOTE — ED PROVIDER NOTE - PHYSICAL EXAMINATION
***GEN - NAD; well appearing; A+O x3 ***HEAD - NC/AT ***EYES/NOSE - PERRL, EOMI, mucous membranes moist, no discharge ***THROAT: Oral cavity and pharynx normal. No inflammation, swelling, exudate, or lesions.  ***NECK: Neck supple, non-tender   ***PULMONARY - CTA b/l, symmetric breath sounds. ***CARDIAC -s1s2, RRR, no M,G,R  ***ABDOMEN - +BS, ND, NT, soft  ***BACK - no CVA tenderness, Normal  spine ***EXTREMITIES - symmetric pulses, 2+ dp, capillary refill < 2 seconds ***SKIN - no rash or bruising   ***NEUROLOGIC - alert, CN 2-12 intact

## 2024-07-02 NOTE — ED ADULT NURSE NOTE - NS ED NURSE LEVEL OF CONSCIOUSNESS AFFECT
Date of Surgery: 8/6/2024    Surgeon:Dr. Quincy Elam     Location:[facility]    Inpatient or Outpatient:Outpatient    CPT code with description: 65351 left breast lumpectomy, with HOPE     DX Code: D24.2     Anesthesia:General    THE FOLLOWING TESTING IS REQUIRED BY ANESTHESIA/[facility] PRIOR TO SURGERY:    Lab Requirements:CBC, CMP,   Must be within 60 days of surgery date     EKG: EKG must be within 6 months of the surgery date     Medical clearance required to be done within 30 days of the procedure date: TO BE COMPLETED BY PCP      History and physical, labs and EKG due to Hospital the Friday prior to procedure         Fax completed History and Physical, Labs, EKG to     Dr. Quincy Elam office: [phone]  Dr. Quincy Elam at [facility]: [phone]        
Calm

## 2024-08-21 NOTE — ED STATDOCS - NS ED ATTENDING STATEMENT MOD
English I have personally performed a face to face diagnostic evaluation on this patient. I have reviewed the NP note and agree with the history, exam, and plan of care, except as noted.

## 2024-09-25 NOTE — ED PROVIDER NOTE - CROS ED GI ALL NEG
Discharge Planning Assessment   Eliseo     Patient Name: Lesli Ambrocio  MRN: [id number]  Today's Date: 9/25/2024    Admit Date: 9/24/2024    Plan: DC PLAN: Routine home with . [facility] thru [facility].       Discharge Needs Assessment       Row Name 09/25/24 1156       Living Environment    People in Home spouse    Current Living Arrangements home    Potentially Unsafe Housing Conditions none    In the past 12 months has the electric, gas, oil, or water company threatened to shut off services in your home? No    Primary Care Provided by self    Provides Primary Care For no one    Family Caregiver if Needed spouse    Quality of Family Relationships helpful;involved;supportive    Able to Return to Prior Arrangements yes       Resource/Environmental Concerns    Resource/Environmental Concerns none    Transportation Concerns none       Transportation Needs    In the past 12 months, has lack of transportation kept you from medical appointments or from getting medications? no    In the past 12 months, has lack of transportation kept you from meetings, work, or from getting things needed for daily living? No       Food Insecurity    Within the past 12 months, you worried that your food would run out before you got the money to buy more. Never true    Within the past 12 months, the food you bought just didn't last and you didn't have money to get more. Never true       Transition Planning    Patient/Family Anticipates Transition to home with family    Patient/Family Anticipated Services at Transition none    Transportation Anticipated car, drives self;family or friend will provide       Discharge Needs Assessment    Readmission Within the Last 30 Days no previous admission in last 30 days    Equipment Currently Used at Home none    Anticipated Changes Related to Illness none    Equipment Needed After Discharge none                   Discharge Plan       Row Name 09/25/24 1156       Plan    Plan DC PLAN: Routine home  with . [facility] thru [place].    Patient/Family in Agreement with Plan yes    Plan Comments CM Met with patient at bedside, from routine home with . Independent with ADL's no DME. PCP is [facility] and Pharmacy is [facility]. Able to afford medications and denies any issues with food or utilities. Denies any transporatation issues, still drives and  will provide at time of discharge. PT notified  that walker is needed. Order and Verbiage requested. DCP report sent to [facility], message sent to Mit[facility] with [facility] verbalized understanding.                      Continued Care and Services - Admitted Since 9/24/2024       Durable Medical Equipment       Service Provider Request Status Selected Services Address Phone Fax Patient Preferred    [facility] Pending - Request Sent N/A [id number] [id number] [phone] --                  Expected Discharge Date and Time       Expected Discharge Date Expected Discharge Time    Sep 26, 2024            Demographic Summary       Row Name 09/25/24 1155       General Information    Admission Type inpatient    Arrived From emergency department    Required Notices Provided Important Message from Medicare    Referral Source admission list    Reason for Consult discharge planning    Preferred Language English       Contact Information    Permission Granted to Share Info With     Contact Information Obtained for                    Functional Status       Row Name 09/25/24 1155       Functional Status    Usual Activity Tolerance excellent    Current Activity Tolerance excellent       Physical Activity    On average, how many days per week do you engage in moderate to strenuous exercise (like a brisk walk)? 0 days    On average, how many minutes do you engage in exercise at this level? 0 min    Number of minutes of exercise per week 0       Assessment of Health Literacy    How often do you  have someone help you read hospital materials? Sometimes    How often do you have problems learning about your medical condition because of difficulty understanding written information? Sometimes    How often do you have a problem understanding what is told to you about your medical condition? Sometimes    How confident are you filling out medical forms by yourself? Somewhat    Health Literacy Moderate       Functional Status, IADL    Medications independent    Meal Preparation independent    Housekeeping independent    Laundry independent    Shopping independent       Mental Status    General Appearance WDL WDL       Mental Status Summary    Recent Changes in Mental Status/Cognitive Functioning no changes                           Patient Forms       Row Name 09/25/24 0937       Patient Forms    Important Message from Medicare (IMM) Delivered  IMM 9/24/24 per registration    Delivered to Patient    Method of delivery In person                  Met with patient in room wearing PPE:     Maintained distance greater than six feet and spent less than 15 minutes in the room.    Mari Jiang RN     [phone]    negative...

## 2024-10-18 PROBLEM — Z78.9 OTHER SPECIFIED HEALTH STATUS: Chronic | Status: ACTIVE | Noted: 2023-11-29

## 2024-11-20 ENCOUNTER — NON-APPOINTMENT (OUTPATIENT)
Age: 44
End: 2024-11-20

## 2024-11-20 ENCOUNTER — APPOINTMENT (OUTPATIENT)
Dept: INTERNAL MEDICINE | Facility: CLINIC | Age: 44
End: 2024-11-20

## 2024-11-20 VITALS
OXYGEN SATURATION: 97 % | HEIGHT: 69 IN | WEIGHT: 170 LBS | HEART RATE: 87 BPM | SYSTOLIC BLOOD PRESSURE: 115 MMHG | BODY MASS INDEX: 25.18 KG/M2 | DIASTOLIC BLOOD PRESSURE: 80 MMHG | TEMPERATURE: 98.1 F | RESPIRATION RATE: 16 BRPM

## 2024-11-20 DIAGNOSIS — Z00.00 ENCOUNTER FOR GENERAL ADULT MEDICAL EXAMINATION W/OUT ABNORMAL FINDINGS: ICD-10-CM

## 2024-11-20 DIAGNOSIS — F17.200 NICOTINE DEPENDENCE, UNSPECIFIED, UNCOMPLICATED: ICD-10-CM

## 2024-11-20 DIAGNOSIS — Z13.6 ENCOUNTER FOR SCREENING FOR CARDIOVASCULAR DISORDERS: ICD-10-CM

## 2024-11-20 PROCEDURE — 99386 PREV VISIT NEW AGE 40-64: CPT

## 2024-11-20 PROCEDURE — 99406 BEHAV CHNG SMOKING 3-10 MIN: CPT

## 2024-11-20 PROCEDURE — 93000 ELECTROCARDIOGRAM COMPLETE: CPT

## 2024-11-20 PROCEDURE — 99204 OFFICE O/P NEW MOD 45 MIN: CPT | Mod: 25

## 2024-11-24 LAB
ALBUMIN SERPL ELPH-MCNC: 4.6 G/DL
ALP BLD-CCNC: 75 U/L
ALT SERPL-CCNC: 15 U/L
ANION GAP SERPL CALC-SCNC: 16 MMOL/L
APPEARANCE: CLEAR
AST SERPL-CCNC: 20 U/L
BASOPHILS # BLD AUTO: 0.11 K/UL
BASOPHILS NFR BLD AUTO: 1.3 %
BILIRUB SERPL-MCNC: 0.9 MG/DL
BILIRUBIN URINE: NEGATIVE
BLOOD URINE: NEGATIVE
BUN SERPL-MCNC: 17 MG/DL
CALCIUM SERPL-MCNC: 8.1 MG/DL
CHLORIDE SERPL-SCNC: 101 MMOL/L
CHOLEST SERPL-MCNC: 210 MG/DL
CO2 SERPL-SCNC: 23 MMOL/L
COLOR: YELLOW
CREAT SERPL-MCNC: 0.68 MG/DL
EGFR: 118 ML/MIN/1.73M2
EOSINOPHIL # BLD AUTO: 0.12 K/UL
EOSINOPHIL NFR BLD AUTO: 1.4 %
ESTIMATED AVERAGE GLUCOSE: 103 MG/DL
GLUCOSE QUALITATIVE U: NEGATIVE MG/DL
GLUCOSE SERPL-MCNC: 73 MG/DL
HBA1C MFR BLD HPLC: 5.2 %
HCT VFR BLD CALC: 46.6 %
HCV AB SER QL: NONREACTIVE
HCV S/CO RATIO: 0.1 S/CO
HDLC SERPL-MCNC: 43 MG/DL
HGB BLD-MCNC: 15.1 G/DL
HIV1+2 AB SPEC QL IA.RAPID: NONREACTIVE
IMM GRANULOCYTES NFR BLD AUTO: 0.3 %
KETONES URINE: NEGATIVE MG/DL
LDLC SERPL CALC-MCNC: 151 MG/DL
LEUKOCYTE ESTERASE URINE: NEGATIVE
LYMPHOCYTES # BLD AUTO: 1.35 K/UL
LYMPHOCYTES NFR BLD AUTO: 15.3 %
MAN DIFF?: NORMAL
MCHC RBC-ENTMCNC: 32.4 G/DL
MCHC RBC-ENTMCNC: 33.9 PG
MCV RBC AUTO: 104.5 FL
MONOCYTES # BLD AUTO: 1.08 K/UL
MONOCYTES NFR BLD AUTO: 12.3 %
NEUTROPHILS # BLD AUTO: 6.11 K/UL
NEUTROPHILS NFR BLD AUTO: 69.4 %
NITRITE URINE: NEGATIVE
NONHDLC SERPL-MCNC: 167 MG/DL
PH URINE: 6
PLATELET # BLD AUTO: 501 K/UL
POTASSIUM SERPL-SCNC: 4.8 MMOL/L
PROT SERPL-MCNC: 7.1 G/DL
PROTEIN URINE: NEGATIVE MG/DL
PSA FREE FLD-MCNC: 22 %
PSA FREE SERPL-MCNC: 0.25 NG/ML
PSA SERPL-MCNC: 1.17 NG/ML
RBC # BLD: 4.46 M/UL
RBC # FLD: 13.5 %
SODIUM SERPL-SCNC: 140 MMOL/L
SPECIFIC GRAVITY URINE: 1.01
TRIGL SERPL-MCNC: 88 MG/DL
TSH SERPL-ACNC: 1.61 UIU/ML
UROBILINOGEN URINE: 0.2 MG/DL
WBC # FLD AUTO: 8.8 K/UL

## 2024-12-08 PROBLEM — R79.89 ELEVATED PLATELET COUNT: Status: ACTIVE | Noted: 2024-12-08

## 2024-12-08 PROBLEM — E78.00 HIGH CHOLESTEROL: Status: ACTIVE | Noted: 2024-12-08

## 2024-12-08 PROBLEM — E83.51 LOW CALCIUM LEVELS: Status: ACTIVE | Noted: 2024-12-08

## 2025-04-23 ENCOUNTER — EMERGENCY (EMERGENCY)
Facility: HOSPITAL | Age: 45
LOS: 0 days | Discharge: HOME | End: 2025-04-23
Attending: STUDENT IN AN ORGANIZED HEALTH CARE EDUCATION/TRAINING PROGRAM
Payer: MEDICAID

## 2025-04-23 VITALS
DIASTOLIC BLOOD PRESSURE: 79 MMHG | OXYGEN SATURATION: 98 % | TEMPERATURE: 97 F | RESPIRATION RATE: 18 BRPM | HEART RATE: 82 BPM | SYSTOLIC BLOOD PRESSURE: 123 MMHG

## 2025-04-23 VITALS
SYSTOLIC BLOOD PRESSURE: 145 MMHG | HEART RATE: 82 BPM | RESPIRATION RATE: 20 BRPM | TEMPERATURE: 98 F | DIASTOLIC BLOOD PRESSURE: 83 MMHG | OXYGEN SATURATION: 98 %

## 2025-04-23 DIAGNOSIS — F10.129 ALCOHOL ABUSE WITH INTOXICATION, UNSPECIFIED: ICD-10-CM
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
## 2025-04-23 DIAGNOSIS — Z98.890 OTHER SPECIFIED POSTPROCEDURAL STATES: Chronic | ICD-10-CM

## 2025-04-23 LAB
ALBUMIN SERPL ELPH-MCNC: 3.5 G/DL — SIGNIFICANT CHANGE UP (ref 3.3–5)
ALP SERPL-CCNC: 74 U/L — SIGNIFICANT CHANGE UP (ref 40–120)
ALT FLD-CCNC: 19 U/L — SIGNIFICANT CHANGE UP (ref 12–78)
ANION GAP SERPL CALC-SCNC: 6 MMOL/L — SIGNIFICANT CHANGE UP (ref 5–17)
AST SERPL-CCNC: 13 U/L — LOW (ref 15–37)
BILIRUB SERPL-MCNC: 0.5 MG/DL — SIGNIFICANT CHANGE UP (ref 0.2–1.2)
BUN SERPL-MCNC: 15 MG/DL — SIGNIFICANT CHANGE UP (ref 7–23)
CALCIUM SERPL-MCNC: 8.9 MG/DL — SIGNIFICANT CHANGE UP (ref 8.5–10.1)
CHLORIDE SERPL-SCNC: 113 MMOL/L — HIGH (ref 96–108)
CO2 SERPL-SCNC: 25 MMOL/L — SIGNIFICANT CHANGE UP (ref 22–31)
CREAT SERPL-MCNC: 0.62 MG/DL — SIGNIFICANT CHANGE UP (ref 0.5–1.3)
EGFR: 121 ML/MIN/1.73M2 — SIGNIFICANT CHANGE UP
EGFR: 121 ML/MIN/1.73M2 — SIGNIFICANT CHANGE UP
ETHANOL SERPL-MCNC: 175 MG/DL — HIGH (ref 0–10)
GLUCOSE BLDC GLUCOMTR-MCNC: 97 MG/DL — SIGNIFICANT CHANGE UP (ref 70–99)
GLUCOSE SERPL-MCNC: 89 MG/DL — SIGNIFICANT CHANGE UP (ref 70–99)
HCT VFR BLD CALC: 45.3 % — SIGNIFICANT CHANGE UP (ref 39–50)
HGB BLD-MCNC: 15.4 G/DL — SIGNIFICANT CHANGE UP (ref 13–17)
MCHC RBC-ENTMCNC: 33.8 PG — SIGNIFICANT CHANGE UP (ref 27–34)
MCHC RBC-ENTMCNC: 34 G/DL — SIGNIFICANT CHANGE UP (ref 32–36)
MCV RBC AUTO: 99.3 FL — SIGNIFICANT CHANGE UP (ref 80–100)
NRBC # BLD AUTO: 0 K/UL — SIGNIFICANT CHANGE UP (ref 0–0)
NRBC # FLD: 0 K/UL — SIGNIFICANT CHANGE UP (ref 0–0)
NRBC BLD AUTO-RTO: 0 /100 WBCS — SIGNIFICANT CHANGE UP (ref 0–0)
PLATELET # BLD AUTO: 418 K/UL — HIGH (ref 150–400)
PMV BLD: 10.1 FL — SIGNIFICANT CHANGE UP (ref 7–13)
POTASSIUM SERPL-MCNC: 3.7 MMOL/L — SIGNIFICANT CHANGE UP (ref 3.5–5.3)
POTASSIUM SERPL-SCNC: 3.7 MMOL/L — SIGNIFICANT CHANGE UP (ref 3.5–5.3)
PROT SERPL-MCNC: 6.8 GM/DL — SIGNIFICANT CHANGE UP (ref 6–8.3)
RBC # BLD: 4.56 M/UL — SIGNIFICANT CHANGE UP (ref 4.2–5.8)
RBC # FLD: 12.5 % — SIGNIFICANT CHANGE UP (ref 10.3–14.5)
SODIUM SERPL-SCNC: 144 MMOL/L — SIGNIFICANT CHANGE UP (ref 135–145)
WBC # BLD: 8.47 K/UL — SIGNIFICANT CHANGE UP (ref 3.8–10.5)
WBC # FLD AUTO: 8.47 K/UL — SIGNIFICANT CHANGE UP (ref 3.8–10.5)

## 2025-04-23 PROCEDURE — 99285 EMERGENCY DEPT VISIT HI MDM: CPT

## 2025-04-23 PROCEDURE — 80307 DRUG TEST PRSMV CHEM ANLYZR: CPT

## 2025-04-23 PROCEDURE — 93010 ELECTROCARDIOGRAM REPORT: CPT

## 2025-04-23 PROCEDURE — 70450 CT HEAD/BRAIN W/O DYE: CPT | Mod: MC

## 2025-04-23 PROCEDURE — 99285 EMERGENCY DEPT VISIT HI MDM: CPT | Mod: 25

## 2025-04-23 PROCEDURE — 93005 ELECTROCARDIOGRAM TRACING: CPT

## 2025-04-23 PROCEDURE — 82962 GLUCOSE BLOOD TEST: CPT

## 2025-04-23 PROCEDURE — 80053 COMPREHEN METABOLIC PANEL: CPT

## 2025-04-23 PROCEDURE — 85027 COMPLETE CBC AUTOMATED: CPT

## 2025-04-23 PROCEDURE — 70450 CT HEAD/BRAIN W/O DYE: CPT | Mod: 26

## 2025-04-23 PROCEDURE — 36415 COLL VENOUS BLD VENIPUNCTURE: CPT

## 2025-04-23 NOTE — PROVIDER CONTACT NOTE (FALL NOTIFICATION) - SITUATION
made aware of pt fall, as per pt, pt states he went to get up and stumbled onto his right side, denies head strike, states his RLE buckled, no one was in attendance to witness pt fall. Medical Necessity Clause: This procedure was medically necessary because the lesions that were treated were: Ndc# For Kenalog Only: [id number] Lot # (Optional): -ev Detail Level: Zone Total Volume Injected (Ccs- Only Use Numbers And Decimals): 0.6 Kenalog Preparation: Kenalog Treatment Number (Optional): 2 Concentration Of Solution Injected (Mg/Ml): 40.0 Expiration Date (Optional): [id number] X Size Of Lesion In Cm (Optional): 0 Include Z78.9 (Other Specified Conditions Influencing Health Status) As An Associated Diagnosis?: No Consent: The risks of atrophy were reviewed with the patient.

## 2025-04-23 NOTE — ED ADULT NURSE NOTE - NSFALLHARMRISKINTERV_ED_ALL_ED
Assistance OOB with selected safe patient handling equipment if applicable/Assistance with ambulation/Communicate risk of Fall with Harm to all staff, patient, and family/Monitor gait and stability/Monitor for mental status changes and reorient to person, place, and time, as needed/Move patient closer to nursing station/within visual sight of ED staff/Provide patient with walking aids/Provide visual cue: red socks, yellow wristband, yellow gown, etc/Reinforce activity limits and safety measures with patient and family/Toileting schedule using arm’s reach rule for commode and bathroom/Use of alarms - bed, stretcher, chair and/or video monitoring/Bed in lowest position, wheels locked, appropriate side rails in place/Call bell, personal items and telephone in reach/Instruct patient to call for assistance before getting out of bed/chair/stretcher/Non-slip footwear applied when patient is off stretcher/[place] to call system/Physically safe environment - no spills, clutter or unnecessary equipment/Purposeful Proactive Rounding/Room/bathroom lighting operational, light cord in reach

## 2025-04-23 NOTE — ED ADULT TRIAGE NOTE - CHIEF COMPLAINT QUOTE
pt presents to the ED for intox. found in the parking lot at [employer] drinkking alcohol. denies drinking any alcohol today. AOB and slurring words.  no fall reported, no recent trauma, no pain or discomfort reported at this time.

## 2025-04-23 NOTE — ED PROVIDER NOTE - PHYSICAL EXAMINATION
Constitutional: unkempt appearing, NAD AAOx3  Eyes: EOMI, PERRL  Head: Normocephalic atraumatic  Mouth: no airway obstruction, posterior oropharynx clear without erythema or exudate  Neck: supple  Cardiac: regular rate and rhythm, no MRG  Resp: Lungs CTAB  GI: Abd s/nt/nd  Neuro: CN2-12 intact, strength 5/5x4, sensation grossly intact  Skin: No rashes  MSK: no midline tenderness of CTL spine, no deformity or tenderness of extemities x4

## 2025-04-23 NOTE — ED PROVIDER NOTE - OBJECTIVE STATEMENT
44yoM PMH TBI BIBA from [facility]11 reportedly sitting in parking lot intoxicated. Pt is AAOX3, states he did not drink alcohol or use drugs. Has no medical complaints. States his voice sounds slurred because he has hx of TBI

## 2025-04-23 NOTE — ED PROVIDER NOTE - NSFOLLOWUPINSTRUCTIONS_ED_ALL_ED_FT
follow-up with your primary care doctor.  Return to the Emergency Department for worsening or persistent symptoms, and/or ANY NEW OR CONCERNING SYMPTOMS. If you have issues obtaining follow up, please call: [phone] or [phone]  to obtain a doctor or specialist who takes your insurance in your area.

## 2025-04-23 NOTE — ED PROVIDER NOTE - PATIENT PORTAL LINK FT
You can access the FollowMyHealth Patient Portal offered by [facility] by registering at the following website: [email or web address]. By joining [facility]’s FollowMyHealth portal, you will also be able to view your health information using other applications (apps) compatible with our system.

## 2025-04-23 NOTE — ED PROVIDER NOTE - CLINICAL SUMMARY MEDICAL DECISION MAKING FREE TEXT BOX
Presentation concerning for acute etoh intoxication, drug use. Plan for EKG, labs, monitor and reassess Presentation concerning for acute etoh intoxication, drug use. Plan for EKG, labs, monitor and reassess. EKG shows NSR, rate 80, normal axis, normal intervals, no NITESH or STD (time 1334). Presentation concerning for acute etoh intoxication, drug use. Plan for EKG, labs, monitor and reassess. EKG shows NSR, rate 80, normal axis, normal intervals, no NITESH or STD (time 1334). CT brain shows no acute actionable findings. Labs show elevated Etoh. After several hours of obs pt clinically sober, ambulating with steady gait, denies si hi ah vh, tolerating PO. advised to stop drinking excess etoh, declining sw or detox.No clinical signs of EtOH withdrawal. Given Follow-up instructions, strict return precautions DC in stable condition

## 2025-04-23 NOTE — ED ADULT NURSE NOTE - OBJECTIVE STATEMENT
Pt presents to er sent in found in a parking lot drinking, pt denies use of alcohol, pt was agitated during blood draw, making fists and shouting, attempts made to redirect pt to lay down, pt declined and kept yelling, screaming and cursing at me, I was notified from Yon PYLE that pt was found on his knees when she came around the corner to the west side of the ED, , pt states he went to get up and stumbled around 13:18 when his right knee buckled due to a previous injury falling onto his right side, denies head strike, pt was standing when I went to assess pt after blood draw, yelling, cursing, code grey called at 13:21 for further assistance due to uncooperative/agressive behaviour, pt declines having family called for notification, as per , she will evaluate for further imaging if needed, no injury/abrasions or lacerations observed, pt assisted back to bed with yellow gown on a bed alarm with stretcher in lowest position, fall debrief/huddle performed with myself, Kayla Escalona and Luigi TEJADA for further safe management of pt care moving forward, pt baseline mentation at this time.